# Patient Record
Sex: MALE | Race: WHITE | NOT HISPANIC OR LATINO | Employment: FULL TIME
[De-identification: names, ages, dates, MRNs, and addresses within clinical notes are randomized per-mention and may not be internally consistent; named-entity substitution may affect disease eponyms.]

---

## 2017-07-20 ENCOUNTER — RECORDS - HEALTHEAST (OUTPATIENT)
Dept: ADMINISTRATIVE | Facility: OTHER | Age: 22
End: 2017-07-20

## 2017-07-26 ENCOUNTER — RECORDS - HEALTHEAST (OUTPATIENT)
Dept: ADMINISTRATIVE | Facility: OTHER | Age: 22
End: 2017-07-26

## 2018-05-03 ENCOUNTER — AMBULATORY - HEALTHEAST (OUTPATIENT)
Dept: INTERNAL MEDICINE | Facility: CLINIC | Age: 23
End: 2018-05-03

## 2018-05-10 ENCOUNTER — RECORDS - HEALTHEAST (OUTPATIENT)
Dept: ADMINISTRATIVE | Facility: OTHER | Age: 23
End: 2018-05-10

## 2018-05-10 ENCOUNTER — AMBULATORY - HEALTHEAST (OUTPATIENT)
Dept: INTERNAL MEDICINE | Facility: CLINIC | Age: 23
End: 2018-05-10

## 2018-05-11 ENCOUNTER — COMMUNICATION - HEALTHEAST (OUTPATIENT)
Dept: TELEHEALTH | Facility: CLINIC | Age: 23
End: 2018-05-11

## 2018-05-11 ENCOUNTER — OFFICE VISIT - HEALTHEAST (OUTPATIENT)
Dept: INTERNAL MEDICINE | Facility: CLINIC | Age: 23
End: 2018-05-11

## 2018-05-11 DIAGNOSIS — Z71.84 TRAVEL ADVICE ENCOUNTER: ICD-10-CM

## 2018-05-11 DIAGNOSIS — E61.1 IRON DEFICIENCY: ICD-10-CM

## 2018-05-11 DIAGNOSIS — Z00.00 ROUTINE GENERAL MEDICAL EXAMINATION AT A HEALTH CARE FACILITY: ICD-10-CM

## 2018-05-11 DIAGNOSIS — K50.80 CROHN'S DISEASE OF BOTH SMALL AND LARGE INTESTINE WITHOUT COMPLICATION (H): ICD-10-CM

## 2018-05-11 LAB — MEV IGG SER IA-ACNC: POSITIVE

## 2018-05-11 ASSESSMENT — MIFFLIN-ST. JEOR: SCORE: 1770.1

## 2018-05-14 ENCOUNTER — RECORDS - HEALTHEAST (OUTPATIENT)
Dept: ADMINISTRATIVE | Facility: OTHER | Age: 23
End: 2018-05-14

## 2018-05-24 ENCOUNTER — AMBULATORY - HEALTHEAST (OUTPATIENT)
Dept: LAB | Facility: CLINIC | Age: 23
End: 2018-05-24

## 2018-07-03 ENCOUNTER — AMBULATORY - HEALTHEAST (OUTPATIENT)
Dept: LAB | Facility: CLINIC | Age: 23
End: 2018-07-03

## 2018-07-03 DIAGNOSIS — K50.80 CROHN'S DISEASE OF BOTH SMALL AND LARGE INTESTINE WITHOUT COMPLICATION (H): ICD-10-CM

## 2018-07-03 LAB
ALT SERPL W P-5'-P-CCNC: 15 U/L (ref 0–45)
AST SERPL W P-5'-P-CCNC: 24 U/L (ref 0–40)
BASOPHILS # BLD AUTO: 0 THOU/UL (ref 0–0.2)
BASOPHILS NFR BLD AUTO: 1 % (ref 0–2)
EOSINOPHIL # BLD AUTO: 0.4 THOU/UL (ref 0–0.4)
EOSINOPHIL NFR BLD AUTO: 8 % (ref 0–6)
ERYTHROCYTE [DISTWIDTH] IN BLOOD BY AUTOMATED COUNT: 13.6 % (ref 11–14.5)
HCT VFR BLD AUTO: 44.3 % (ref 40–54)
HGB BLD-MCNC: 14.7 G/DL (ref 14–18)
LYMPHOCYTES # BLD AUTO: 1.5 THOU/UL (ref 0.8–4.4)
LYMPHOCYTES NFR BLD AUTO: 29 % (ref 20–40)
MCH RBC QN AUTO: 30.9 PG (ref 27–34)
MCHC RBC AUTO-ENTMCNC: 33.2 G/DL (ref 32–36)
MCV RBC AUTO: 93 FL (ref 80–100)
MONOCYTES # BLD AUTO: 0.7 THOU/UL (ref 0–0.9)
MONOCYTES NFR BLD AUTO: 14 % (ref 2–10)
NEUTROPHILS # BLD AUTO: 2.5 THOU/UL (ref 2–7.7)
NEUTROPHILS NFR BLD AUTO: 48 % (ref 50–70)
PLATELET # BLD AUTO: 227 THOU/UL (ref 140–440)
PMV BLD AUTO: 7.4 FL (ref 7–10)
RBC # BLD AUTO: 4.77 MILL/UL (ref 4.4–6.2)
WBC: 5.1 THOU/UL (ref 4–11)

## 2018-07-05 ENCOUNTER — COMMUNICATION - HEALTHEAST (OUTPATIENT)
Dept: INTERNAL MEDICINE | Facility: CLINIC | Age: 23
End: 2018-07-05

## 2018-11-21 ENCOUNTER — AMBULATORY - HEALTHEAST (OUTPATIENT)
Dept: LAB | Facility: CLINIC | Age: 23
End: 2018-11-21

## 2018-11-21 ENCOUNTER — COMMUNICATION - HEALTHEAST (OUTPATIENT)
Dept: INTERNAL MEDICINE | Facility: CLINIC | Age: 23
End: 2018-11-21

## 2018-11-21 DIAGNOSIS — K50.80 CROHN'S DISEASE OF BOTH SMALL AND LARGE INTESTINE WITHOUT COMPLICATION (H): ICD-10-CM

## 2018-11-21 LAB
ALT SERPL W P-5'-P-CCNC: 12 U/L (ref 0–45)
AST SERPL W P-5'-P-CCNC: 21 U/L (ref 0–40)
BASOPHILS # BLD AUTO: 0.1 THOU/UL (ref 0–0.2)
BASOPHILS NFR BLD AUTO: 2 % (ref 0–2)
EOSINOPHIL # BLD AUTO: 0.3 THOU/UL (ref 0–0.4)
EOSINOPHIL NFR BLD AUTO: 5 % (ref 0–6)
ERYTHROCYTE [DISTWIDTH] IN BLOOD BY AUTOMATED COUNT: 15.7 % (ref 11–14.5)
HCT VFR BLD AUTO: 44.5 % (ref 40–54)
HGB BLD-MCNC: 14.9 G/DL (ref 14–18)
LYMPHOCYTES # BLD AUTO: 1.1 THOU/UL (ref 0.8–4.4)
LYMPHOCYTES NFR BLD AUTO: 22 % (ref 20–40)
MCH RBC QN AUTO: 31 PG (ref 27–34)
MCHC RBC AUTO-ENTMCNC: 33.5 G/DL (ref 32–36)
MCV RBC AUTO: 92 FL (ref 80–100)
MONOCYTES # BLD AUTO: 0.5 THOU/UL (ref 0–0.9)
MONOCYTES NFR BLD AUTO: 10 % (ref 2–10)
NEUTROPHILS # BLD AUTO: 3.2 THOU/UL (ref 2–7.7)
NEUTROPHILS NFR BLD AUTO: 62 % (ref 50–70)
PLATELET # BLD AUTO: 270 THOU/UL (ref 140–440)
PMV BLD AUTO: 7.2 FL (ref 7–10)
RBC # BLD AUTO: 4.81 MILL/UL (ref 4.4–6.2)
WBC: 5.1 THOU/UL (ref 4–11)

## 2018-11-28 ENCOUNTER — COMMUNICATION - HEALTHEAST (OUTPATIENT)
Dept: INTERNAL MEDICINE | Facility: CLINIC | Age: 23
End: 2018-11-28

## 2018-12-19 ENCOUNTER — RECORDS - HEALTHEAST (OUTPATIENT)
Dept: ADMINISTRATIVE | Facility: OTHER | Age: 23
End: 2018-12-19

## 2019-01-04 ENCOUNTER — AMBULATORY - HEALTHEAST (OUTPATIENT)
Dept: LAB | Facility: CLINIC | Age: 24
End: 2019-01-04

## 2019-01-04 DIAGNOSIS — K50.80 CROHN'S DISEASE OF BOTH SMALL AND LARGE INTESTINE WITHOUT COMPLICATION (H): ICD-10-CM

## 2019-01-04 LAB
ALT SERPL W P-5'-P-CCNC: 38 U/L (ref 0–45)
AST SERPL W P-5'-P-CCNC: 26 U/L (ref 0–40)
BASOPHILS # BLD AUTO: 0.1 THOU/UL (ref 0–0.2)
BASOPHILS NFR BLD AUTO: 1 % (ref 0–2)
EOSINOPHIL # BLD AUTO: 0.2 THOU/UL (ref 0–0.4)
EOSINOPHIL NFR BLD AUTO: 4 % (ref 0–6)
ERYTHROCYTE [DISTWIDTH] IN BLOOD BY AUTOMATED COUNT: 13 % (ref 11–14.5)
HCT VFR BLD AUTO: 45.4 % (ref 40–54)
HGB BLD-MCNC: 15.1 G/DL (ref 14–18)
LYMPHOCYTES # BLD AUTO: 1.4 THOU/UL (ref 0.8–4.4)
LYMPHOCYTES NFR BLD AUTO: 27 % (ref 20–40)
MCH RBC QN AUTO: 31.5 PG (ref 27–34)
MCHC RBC AUTO-ENTMCNC: 33.2 G/DL (ref 32–36)
MCV RBC AUTO: 95 FL (ref 80–100)
MONOCYTES # BLD AUTO: 0.4 THOU/UL (ref 0–0.9)
MONOCYTES NFR BLD AUTO: 8 % (ref 2–10)
NEUTROPHILS # BLD AUTO: 3.1 THOU/UL (ref 2–7.7)
NEUTROPHILS NFR BLD AUTO: 60 % (ref 50–70)
PLATELET # BLD AUTO: 264 THOU/UL (ref 140–440)
PMV BLD AUTO: 8.1 FL (ref 7–10)
RBC # BLD AUTO: 4.78 MILL/UL (ref 4.4–6.2)
WBC: 5.1 THOU/UL (ref 4–11)

## 2019-08-15 ENCOUNTER — AMBULATORY - HEALTHEAST (OUTPATIENT)
Dept: LAB | Facility: CLINIC | Age: 24
End: 2019-08-15

## 2019-08-15 ENCOUNTER — COMMUNICATION - HEALTHEAST (OUTPATIENT)
Dept: INTERNAL MEDICINE | Facility: CLINIC | Age: 24
End: 2019-08-15

## 2019-08-15 DIAGNOSIS — K50.90 CROHN DISEASE (H): ICD-10-CM

## 2019-08-15 LAB
ALT SERPL W P-5'-P-CCNC: 18 U/L (ref 0–45)
AST SERPL W P-5'-P-CCNC: 20 U/L (ref 0–40)
BASOPHILS # BLD AUTO: 0.1 THOU/UL (ref 0–0.2)
BASOPHILS NFR BLD AUTO: 1 % (ref 0–2)
EOSINOPHIL # BLD AUTO: 0.3 THOU/UL (ref 0–0.4)
EOSINOPHIL NFR BLD AUTO: 4 % (ref 0–6)
ERYTHROCYTE [DISTWIDTH] IN BLOOD BY AUTOMATED COUNT: 13.3 % (ref 11–14.5)
HCT VFR BLD AUTO: 48 % (ref 40–54)
HGB BLD-MCNC: 16.2 G/DL (ref 14–18)
LYMPHOCYTES # BLD AUTO: 1.6 THOU/UL (ref 0.8–4.4)
LYMPHOCYTES NFR BLD AUTO: 23 % (ref 20–40)
MCH RBC QN AUTO: 32.4 PG (ref 27–34)
MCHC RBC AUTO-ENTMCNC: 33.8 G/DL (ref 32–36)
MCV RBC AUTO: 96 FL (ref 80–100)
MONOCYTES # BLD AUTO: 0.7 THOU/UL (ref 0–0.9)
MONOCYTES NFR BLD AUTO: 10 % (ref 2–10)
NEUTROPHILS # BLD AUTO: 4.3 THOU/UL (ref 2–7.7)
NEUTROPHILS NFR BLD AUTO: 62 % (ref 50–70)
PLATELET # BLD AUTO: 268 THOU/UL (ref 140–440)
PMV BLD AUTO: 7.4 FL (ref 7–10)
RBC # BLD AUTO: 5.01 MILL/UL (ref 4.4–6.2)
WBC: 6.9 THOU/UL (ref 4–11)

## 2019-08-16 ENCOUNTER — COMMUNICATION - HEALTHEAST (OUTPATIENT)
Dept: INTERNAL MEDICINE | Facility: CLINIC | Age: 24
End: 2019-08-16

## 2019-12-17 ENCOUNTER — RECORDS - HEALTHEAST (OUTPATIENT)
Dept: ADMINISTRATIVE | Facility: OTHER | Age: 24
End: 2019-12-17

## 2019-12-30 ENCOUNTER — AMBULATORY - HEALTHEAST (OUTPATIENT)
Dept: LAB | Facility: CLINIC | Age: 24
End: 2019-12-30

## 2019-12-30 DIAGNOSIS — K50.90 CROHN DISEASE (H): ICD-10-CM

## 2019-12-30 LAB
ALT SERPL W P-5'-P-CCNC: 25 U/L (ref 0–45)
AST SERPL W P-5'-P-CCNC: 24 U/L (ref 0–40)
BASOPHILS # BLD AUTO: 0 THOU/UL (ref 0–0.2)
BASOPHILS NFR BLD AUTO: 1 % (ref 0–2)
EOSINOPHIL # BLD AUTO: 0.2 THOU/UL (ref 0–0.4)
EOSINOPHIL NFR BLD AUTO: 3 % (ref 0–6)
ERYTHROCYTE [DISTWIDTH] IN BLOOD BY AUTOMATED COUNT: 12.6 % (ref 11–14.5)
HCT VFR BLD AUTO: 48.6 % (ref 40–54)
HGB BLD-MCNC: 16.2 G/DL (ref 14–18)
LYMPHOCYTES # BLD AUTO: 1.1 THOU/UL (ref 0.8–4.4)
LYMPHOCYTES NFR BLD AUTO: 17 % (ref 20–40)
MCH RBC QN AUTO: 33.6 PG (ref 27–34)
MCHC RBC AUTO-ENTMCNC: 33.4 G/DL (ref 32–36)
MCV RBC AUTO: 101 FL (ref 80–100)
MONOCYTES # BLD AUTO: 0.6 THOU/UL (ref 0–0.9)
MONOCYTES NFR BLD AUTO: 10 % (ref 2–10)
NEUTROPHILS # BLD AUTO: 4.2 THOU/UL (ref 2–7.7)
NEUTROPHILS NFR BLD AUTO: 70 % (ref 50–70)
PLATELET # BLD AUTO: 272 THOU/UL (ref 140–440)
PMV BLD AUTO: 7.3 FL (ref 7–10)
RBC # BLD AUTO: 4.84 MILL/UL (ref 4.4–6.2)
WBC: 6.1 THOU/UL (ref 4–11)

## 2020-01-05 ENCOUNTER — COMMUNICATION - HEALTHEAST (OUTPATIENT)
Dept: INTERNAL MEDICINE | Facility: CLINIC | Age: 25
End: 2020-01-05

## 2020-12-28 ENCOUNTER — AMBULATORY - HEALTHEAST (OUTPATIENT)
Dept: LAB | Facility: CLINIC | Age: 25
End: 2020-12-28

## 2020-12-28 DIAGNOSIS — K50.80 CROHN'S ILEOCOLITIS (H): ICD-10-CM

## 2020-12-28 LAB
BASOPHILS # BLD AUTO: 0 THOU/UL (ref 0–0.2)
BASOPHILS NFR BLD AUTO: 1 % (ref 0–2)
EOSINOPHIL # BLD AUTO: 0.3 THOU/UL (ref 0–0.4)
EOSINOPHIL NFR BLD AUTO: 5 % (ref 0–6)
ERYTHROCYTE [DISTWIDTH] IN BLOOD BY AUTOMATED COUNT: 12 % (ref 11–14.5)
HCT VFR BLD AUTO: 44.1 % (ref 40–54)
HGB BLD-MCNC: 15.3 G/DL (ref 14–18)
LYMPHOCYTES # BLD AUTO: 1.9 THOU/UL (ref 0.8–4.4)
LYMPHOCYTES NFR BLD AUTO: 37 % (ref 20–40)
MCH RBC QN AUTO: 29.1 PG (ref 27–34)
MCHC RBC AUTO-ENTMCNC: 34.6 G/DL (ref 32–36)
MCV RBC AUTO: 84 FL (ref 80–100)
MONOCYTES # BLD AUTO: 0.4 THOU/UL (ref 0–0.9)
MONOCYTES NFR BLD AUTO: 8 % (ref 2–10)
NEUTROPHILS # BLD AUTO: 2.4 THOU/UL (ref 2–7.7)
NEUTROPHILS NFR BLD AUTO: 49 % (ref 50–70)
PLATELET # BLD AUTO: 203 THOU/UL (ref 140–440)
PMV BLD AUTO: 7.3 FL (ref 7–10)
RBC # BLD AUTO: 5.24 MILL/UL (ref 4.4–6.2)
WBC: 5 THOU/UL (ref 4–11)

## 2020-12-29 LAB
ALT SERPL W P-5'-P-CCNC: 77 U/L (ref 0–45)
AST SERPL W P-5'-P-CCNC: 83 U/L (ref 0–40)

## 2020-12-30 ENCOUNTER — COMMUNICATION - HEALTHEAST (OUTPATIENT)
Dept: INTERNAL MEDICINE | Facility: CLINIC | Age: 25
End: 2020-12-30

## 2021-01-11 ENCOUNTER — AMBULATORY - HEALTHEAST (OUTPATIENT)
Dept: LAB | Facility: CLINIC | Age: 26
End: 2021-01-11

## 2021-01-11 DIAGNOSIS — K50.80 CROHN'S ILEOCOLITIS (H): ICD-10-CM

## 2021-01-14 ENCOUNTER — AMBULATORY - HEALTHEAST (OUTPATIENT)
Dept: LAB | Facility: CLINIC | Age: 26
End: 2021-01-14

## 2021-01-14 DIAGNOSIS — K50.80 CROHN'S ILEOCOLITIS (H): ICD-10-CM

## 2021-01-14 LAB
BASOPHILS # BLD AUTO: 0 THOU/UL (ref 0–0.2)
BASOPHILS NFR BLD AUTO: 1 % (ref 0–2)
EOSINOPHIL # BLD AUTO: 0.2 THOU/UL (ref 0–0.4)
EOSINOPHIL NFR BLD AUTO: 4 % (ref 0–6)
ERYTHROCYTE [DISTWIDTH] IN BLOOD BY AUTOMATED COUNT: 12.4 % (ref 11–14.5)
HCT VFR BLD AUTO: 46 % (ref 40–54)
HGB BLD-MCNC: 16 G/DL (ref 14–18)
LYMPHOCYTES # BLD AUTO: 2 THOU/UL (ref 0.8–4.4)
LYMPHOCYTES NFR BLD AUTO: 38 % (ref 20–40)
MCH RBC QN AUTO: 29.4 PG (ref 27–34)
MCHC RBC AUTO-ENTMCNC: 34.9 G/DL (ref 32–36)
MCV RBC AUTO: 84 FL (ref 80–100)
MONOCYTES # BLD AUTO: 0.5 THOU/UL (ref 0–0.9)
MONOCYTES NFR BLD AUTO: 10 % (ref 2–10)
NEUTROPHILS # BLD AUTO: 2.6 THOU/UL (ref 2–7.7)
NEUTROPHILS NFR BLD AUTO: 48 % (ref 50–70)
PLATELET # BLD AUTO: 185 THOU/UL (ref 140–440)
PMV BLD AUTO: 7.2 FL (ref 7–10)
RBC # BLD AUTO: 5.46 MILL/UL (ref 4.4–6.2)
WBC: 5.4 THOU/UL (ref 4–11)

## 2021-01-15 LAB
ALT SERPL W P-5'-P-CCNC: 27 U/L (ref 0–45)
AST SERPL W P-5'-P-CCNC: 20 U/L (ref 0–40)

## 2021-01-18 ENCOUNTER — COMMUNICATION - HEALTHEAST (OUTPATIENT)
Dept: INTERNAL MEDICINE | Facility: CLINIC | Age: 26
End: 2021-01-18

## 2021-02-02 ENCOUNTER — RECORDS - HEALTHEAST (OUTPATIENT)
Dept: ADMINISTRATIVE | Facility: OTHER | Age: 26
End: 2021-02-02

## 2021-03-03 ENCOUNTER — OFFICE VISIT - HEALTHEAST (OUTPATIENT)
Dept: INTERNAL MEDICINE | Facility: CLINIC | Age: 26
End: 2021-03-03

## 2021-03-03 DIAGNOSIS — K50.80 CROHN'S DISEASE OF BOTH SMALL AND LARGE INTESTINE WITHOUT COMPLICATION (H): ICD-10-CM

## 2021-03-03 DIAGNOSIS — G44.229 CHRONIC TENSION-TYPE HEADACHE, NOT INTRACTABLE: ICD-10-CM

## 2021-03-03 ASSESSMENT — MIFFLIN-ST. JEOR: SCORE: 1829.07

## 2021-03-10 ENCOUNTER — HOSPITAL ENCOUNTER (OUTPATIENT)
Dept: MRI IMAGING | Facility: HOSPITAL | Age: 26
Discharge: HOME | End: 2021-03-10
Attending: INTERNAL MEDICINE

## 2021-03-10 DIAGNOSIS — G44.229 CHRONIC TENSION-TYPE HEADACHE, NOT INTRACTABLE: ICD-10-CM

## 2021-05-30 ENCOUNTER — HEALTH MAINTENANCE LETTER (OUTPATIENT)
Age: 26
End: 2021-05-30

## 2021-05-31 NOTE — TELEPHONE ENCOUNTER
Who is calling:  Servando  Reason for Call:  Did clinic get the lab order from [facility] that was faxed yesterday?  Date of last appointment with primary care: 5/11/18  Okay to leave a detailed message: Yes

## 2021-06-01 VITALS — BODY MASS INDEX: 21.07 KG/M2 | WEIGHT: 159 LBS | HEIGHT: 73 IN

## 2021-06-05 VITALS
HEIGHT: 73 IN | OXYGEN SATURATION: 98 % | WEIGHT: 172 LBS | DIASTOLIC BLOOD PRESSURE: 78 MMHG | BODY MASS INDEX: 22.8 KG/M2 | HEART RATE: 72 BPM | SYSTOLIC BLOOD PRESSURE: 112 MMHG

## 2021-06-15 NOTE — PROGRESS NOTES
Office Visit - Follow Up   Servando Mcdonough   25 y.o. male    Date of Visit: 3/3/2021    Chief Complaint   Patient presents with     Migraine        Assessment and Plan   1. Chronic tension-type headache, not intractable  This is a 25-year-old man with new onset of headache over the left temple.  Initially severe and now persistent and making.  He is on immunosuppressive medication.  Family history of glioblastoma.  He has no exam findings to suggest meningitis.  Fairly consistent with migraine type headache.  I have recommended MRI as below.  I also suggested regular use of acetaminophen for about a week to see if he can break the headache cycle.  - MR Brain Without Contrast; Future    2. Crohn's disease of both small and large intestine without complication (H)  As above improved with Humira    Return in about 4 weeks (around 3/31/2021) for office visit, if symptoms worsen/fail to improve.     History of Present Illness   This 25 y.o. old man comes in for evaluation of a headache.  Started about 3 weeks ago with a very severe headache on the left temple area.  He went to bed and this improved and since then he has had mild pain on the same area that has not gone away.  He has tried some Tylenol which is helped modestly.  He has had no associated photophobia or nausea or vomiting.  No visual scotomata.  A little bit of tingling in his legs yesterday which is resolved.  No weight changes no fever chills.  Slight neck tenderness which has resolved as well.  No significant confusion although felt a little bit foggy.  He is on immunosuppressive medication for Crohn's disease which she recently started.  He does not drink all the much alcohol.  He recently started coffee regularly at the beginning of the year.  He has 1 or 2 cups in the morning.  History of migraine headaches.    Review of Systems: A comprehensive review of systems was negative except as noted.     Medications, Allergies and Problem List   Reviewed,  "reconciled and updated  Post Discharge Medication Reconciliation Status:      Physical Exam   General Appearance:   No acute distress    /78 (Patient Site: Left Arm, Patient Position: Sitting, Cuff Size: Adult Regular)   Pulse 72   Ht 6' 1\" (1.854 m)   Wt 172 lb (78 kg)   SpO2 98%   BMI 22.69 kg/m      HEENT exam is unremarkable  Neck supple no thyromegaly or nodule palpable  Lymphatic no cervical lymphadenopathy  Cardiovascular regular rate and rhythm no murmur gallop or rub  Pulmonary lungs are clear to auscultation bilaterally  Gastrointestinal abdomen soft nontender nondistended no organomegaly  Neurologic exam is entirely normal  Psychiatric pleasant, no confusion or agitation        Additional Information   Current Outpatient Medications   Medication Sig Dispense Refill     adalimumab 80 mg/0.8 mL PnKt Inject 80 mg under the skin.       No current facility-administered medications for this visit.      No Known Allergies  Social History     Tobacco Use     Smoking status: Never Smoker     Smokeless tobacco: Never Used   Substance Use Topics     Alcohol use: Yes     Drug use: No       Review and/or order of clinical lab tests:  Review and/or order of radiology tests:  Review and/or order of medicine tests:  Discussion of test results with performing physician:  Decision to obtain old records and/or obtain history from someone other than the patient:  Review and summarization of old records and/or obtaining history from someone other than the patient and.or discussion of case with another health care provider:  Independent visualization of image, tracing or specimen itself:    Time:      Mesfin Costello MD  "

## 2021-06-17 NOTE — PROGRESS NOTES
Office Visit - Physical   Servando Mcdonough   22 y.o.  male    Date of visit: 5/11/2018  Physician: Mesfin Costello MD     Assessment and Plan   1. Routine general medical examination at a health care facility  This is a healthy 22-year-old man.  Declines screening for sexually transmitted infections.  Will get labs every 3 months from [facility] for monitoring of Crohn's disease.  He will send us the lab order.    2. Travel advice encounter  Traveling to [place] and apparently there is a outbreak of measles  - Rubeola Antibody, IgG    3. Crohn's disease of both small and large intestine without complication  Well-controlled per [facility] labs every 3 months    4. Iron deficiency  We will follow his hemoglobin and ferritin presumably with [facility] labs.  Upper endoscopy recently unremarkable.  Negative testing for celiac disease.  No H pylori.  Some proctitis on colonoscopy.    Return in about 1 year (around 5/11/2019) for annual physical.     Chief Complaint   Chief Complaint   Patient presents with     [facility]        Patient Profile   Social History     Social History Narrative    Sophomore at [employer], studying finance.  Kiera Calderon.          Past Medical History   Patient Active Problem List   Diagnosis     Crohn's disease of both small and large intestine       Past Surgical History  He has a past surgical history that includes Appendectomy; Colonoscopy; and Tonsillectomy.     History of Present Illness   This 22 y.o. old man comes in to establish care and for annual physical.  His medical history was reviewed, electronic medical records updated and is reflected in this note.  Overall he is quite healthy.  He does have Crohn's disease and follows at [facility].  Well controlled with oral medication.  Has had some iron deficiency without anemia.    Review of Systems: A comprehensive review of systems was negative except as noted.     Medications and Allergies   Current Outpatient Prescriptions  "  Medication Sig Dispense Refill     mercaptopurine (PURINETHOL) 50 mg tablet Take 75 mg by mouth daily.       No current facility-administered medications for this visit.      No Known Allergies     Family and Social History   Family History   Problem Relation Age of Onset     No Medical Problems Mother      Prostate cancer Father      No Medical Problems Brother      Crohn's disease Brother      No Medical Problems Brother         Social History   Substance Use Topics     Smoking status: Never Smoker     Smokeless tobacco: Never Used     Alcohol use Yes        Physical Exam   General Appearance:   No acute distress    /68 (Patient Site: Left Arm, Patient Position: Sitting, Cuff Size: Adult Regular)  Pulse 84  Ht 6' 1\" (1.854 m)  Wt 159 lb (72.1 kg)  SpO2 98%  BMI 20.98 kg/m2    EYES: Eyelids, conjunctiva, and sclera were normal. Pupils were normal. Cornea, iris, and lens were normal bilaterally.  HEAD, EARS, NOSE, MOUTH, AND THROAT: Head and face were normal. Hearing was normal to voice and the ears were normal to external exam. Nose appearance was normal and there was no discharge. Oropharynx was normal.  NECK: Neck appearance was normal. There were no neck masses and the thyroid was not enlarged.  RESPIRATORY: Breathing pattern was normal and the chest moved symmetrically.  Percussion/auscultatory percussion was normal.  Lung sounds were normal and there were no abnormal sounds.  CARDIOVASCULAR: Heart rate and rhythm were normal.  S1 and S2 were normal and there were no extra sounds or murmurs. Peripheral pulses in arms and legs were normal.  Jugular venous pressure was normal.  There was no peripheral edema.  GASTROINTESTINAL: The abdomen was normal in contour.  Bowel sounds were present.  Percussion detected no organ enlargement or tenderness.  Palpation detected no tenderness, mass, or enlarged organs.   MUSCULOSKELETAL: Skeletal configuration was normal and muscle mass was normal for age. Joint " appearance was overall normal.  LYMPHATIC: There were no enlarged nodes.  SKIN/HAIR/NAILS: Skin color was normal.  There were no skin lesions.  Hair and nails were normal.  NEUROLOGIC: The patient was alert and oriented to person, place, time, and circumstance. Speech was normal. Cranial nerves were normal. Motor strength was normal for age. The patient was normally coordinated.  PSYCHIATRIC:  Mood and affect were normal and the patient had normal recent and remote memory. The patient's judgment and insight were normal.       Additional Information        Mesfin Costello MD  Internal Medicine  Contact me at [phone]

## 2021-06-19 NOTE — LETTER
Letter by Mesfin Costello MD at      Author: Mesfin Costello MD Service: -- Author Type: --    Filed:  Encounter Date: 8/16/2019 Status: (Other)         Servando Mcdonough  [address] [place] [address]             August 16, 2019         Dear Mr. Mcdonough,    Below are the results from your recent visit:    Resulted Orders   AST (SGOT)   Result Value Ref Range    AST 20 0 - 40 U/L   ALT (SGPT)   Result Value Ref Range    ALT 18 0 - 45 U/L   HM1 (CBC with Diff)   Result Value Ref Range    WBC 6.9 4.0 - 11.0 thou/uL    RBC 5.01 4.40 - 6.20 mill/uL    Hemoglobin 16.2 14.0 - 18.0 g/dL    Hematocrit 48.0 40.0 - 54.0 %    MCV 96 80 - 100 fL    MCH 32.4 27.0 - 34.0 pg    MCHC 33.8 32.0 - 36.0 g/dL    RDW 13.3 11.0 - 14.5 %    Platelets 268 140 - 440 thou/uL    MPV 7.4 7.0 - 10.0 fL    Neutrophils % 62 50 - 70 %    Lymphocytes % 23 20 - 40 %    Monocytes % 10 2 - 10 %    Eosinophils % 4 0 - 6 %    Basophils % 1 0 - 2 %    Neutrophils Absolute 4.3 2.0 - 7.7 thou/uL    Lymphocytes Absolute 1.6 0.8 - 4.4 thou/uL    Monocytes Absolute 0.7 0.0 - 0.9 thou/uL    Eosinophils Absolute 0.3 0.0 - 0.4 thou/uL    Basophils Absolute 0.1 0.0 - 0.2 thou/uL       Labs all look okay, excellent    Please call with questions or contact us using [employer].    Sincerely,        Electronically signed by Mesfin Costello MD

## 2021-06-20 NOTE — LETTER
Letter by Mesfin Costello MD at      Author: Mesfin Costello MD Service: -- Author Type: --    Filed:  Encounter Date: 1/5/2020 Status: Signed         Servando Mcdonough  [address] [place] [address]             January 5, 2020         Dear Mr. Mcdonough,    Below are the results from your recent visit:    Resulted Orders   AST (SGOT)   Result Value Ref Range    AST 24 0 - 40 U/L   ALT (SGPT)   Result Value Ref Range    ALT 25 0 - 45 U/L   HM1 (CBC with Diff)   Result Value Ref Range    WBC 6.1 4.0 - 11.0 thou/uL    RBC 4.84 4.40 - 6.20 mill/uL    Hemoglobin 16.2 14.0 - 18.0 g/dL    Hematocrit 48.6 40.0 - 54.0 %     (H) 80 - 100 fL    MCH 33.6 27.0 - 34.0 pg    MCHC 33.4 32.0 - 36.0 g/dL    RDW 12.6 11.0 - 14.5 %    Platelets 272 140 - 440 thou/uL    MPV 7.3 7.0 - 10.0 fL    Neutrophils % 70 50 - 70 %    Lymphocytes % 17 (L) 20 - 40 %    Monocytes % 10 2 - 10 %    Eosinophils % 3 0 - 6 %    Basophils % 1 0 - 2 %    Neutrophils Absolute 4.2 2.0 - 7.7 thou/uL    Lymphocytes Absolute 1.1 0.8 - 4.4 thou/uL    Monocytes Absolute 0.6 0.0 - 0.9 thou/uL    Eosinophils Absolute 0.2 0.0 - 0.4 thou/uL    Basophils Absolute 0.0 0.0 - 0.2 thou/uL       Labs ok/stable, excellent    Please call with questions or contact us using [employer].    Sincerely,        Electronically signed by Mesfin Costello MD

## 2021-06-21 NOTE — LETTER
Letter by Mesfin Costello MD at      Author: Mesfin Costello MD Service: -- Author Type: --    Filed:  Encounter Date: 1/18/2021 Status: (Other)         Servando Mcdonough  [address] [place] [address]             January 18, 2021         Dear Mr. Mcdonough,    Below are the results from your recent visit:    Resulted Orders   ALT (SGPT)   Result Value Ref Range    ALT 27 0 - 45 U/L   AST (SGOT)   Result Value Ref Range    AST 20 0 - 40 U/L   HM1 (CBC with Diff)   Result Value Ref Range    WBC 5.4 4.0 - 11.0 thou/uL    RBC 5.46 4.40 - 6.20 mill/uL    Hemoglobin 16.0 14.0 - 18.0 g/dL    Hematocrit 46.0 40.0 - 54.0 %    MCV 84 80 - 100 fL    MCH 29.4 27.0 - 34.0 pg    MCHC 34.9 32.0 - 36.0 g/dL    RDW 12.4 11.0 - 14.5 %    Platelets 185 140 - 440 thou/uL    MPV 7.2 7.0 - 10.0 fL    Neutrophils % 48 (L) 50 - 70 %    Lymphocytes % 38 20 - 40 %    Monocytes % 10 2 - 10 %    Eosinophils % 4 0 - 6 %    Basophils % 1 0 - 2 %    Neutrophils Absolute 2.6 2.0 - 7.7 thou/uL    Lymphocytes Absolute 2.0 0.8 - 4.4 thou/uL    Monocytes Absolute 0.5 0.0 - 0.9 thou/uL    Eosinophils Absolute 0.2 0.0 - 0.4 thou/uL    Basophils Absolute 0.0 0.0 - 0.2 thou/uL       Labs look okay/improved, excellent    Please call with questions or contact us using [employer].    Sincerely,        Electronically signed by Mesfin Costello MD

## 2021-06-21 NOTE — LETTER
Letter by Mesfin Costello MD at      Author: Mesfin Costello MD Service: -- Author Type: --    Filed:  Encounter Date: 12/30/2020 Status: (Other)         Servando Mcdonough  [address] [place] [address]             December 30, 2020         Dear Mr. Mcdonough,    Below are the results from your recent visit:    Resulted Orders   ALT (SGPT)   Result Value Ref Range    ALT 77 (H) 0 - 45 U/L   AST (SGOT)   Result Value Ref Range    AST 83 (H) 0 - 40 U/L   HM1 (CBC with Diff)   Result Value Ref Range    WBC 5.0 4.0 - 11.0 thou/uL    RBC 5.24 4.40 - 6.20 mill/uL    Hemoglobin 15.3 14.0 - 18.0 g/dL    Hematocrit 44.1 40.0 - 54.0 %    MCV 84 80 - 100 fL    MCH 29.1 27.0 - 34.0 pg    MCHC 34.6 32.0 - 36.0 g/dL    RDW 12.0 11.0 - 14.5 %    Platelets 203 140 - 440 thou/uL    MPV 7.3 7.0 - 10.0 fL    Neutrophils % 49 (L) 50 - 70 %    Lymphocytes % 37 20 - 40 %    Monocytes % 8 2 - 10 %    Eosinophils % 5 0 - 6 %    Basophils % 1 0 - 2 %    Neutrophils Absolute 2.4 2.0 - 7.7 thou/uL    Lymphocytes Absolute 1.9 0.8 - 4.4 thou/uL    Monocytes Absolute 0.4 0.0 - 0.9 thou/uL    Eosinophils Absolute 0.3 0.0 - 0.4 thou/uL    Basophils Absolute 0.0 0.0 - 0.2 thou/uL       Labs show mild elevation in your liver tests.  I believe these were ordered by [facility].  Please discuss with [place].  Happy to discuss with you as well, if needed.    Please call with questions or contact us using [employer].    Sincerely,        Electronically signed by Mesfin Costello MD

## 2021-06-29 ENCOUNTER — OFFICE VISIT - HEALTHEAST (OUTPATIENT)
Dept: INTERNAL MEDICINE | Facility: CLINIC | Age: 26
End: 2021-06-29

## 2021-06-29 DIAGNOSIS — R07.89 CHEST WALL PAIN: ICD-10-CM

## 2021-06-29 DIAGNOSIS — Z11.4 SCREENING FOR HIV (HUMAN IMMUNODEFICIENCY VIRUS): ICD-10-CM

## 2021-06-29 DIAGNOSIS — K50.80 CROHN'S DISEASE OF BOTH SMALL AND LARGE INTESTINE WITHOUT COMPLICATION (H): ICD-10-CM

## 2021-06-29 DIAGNOSIS — R07.89 OTHER CHEST PAIN: ICD-10-CM

## 2021-06-29 DIAGNOSIS — Z00.00 ROUTINE GENERAL MEDICAL EXAMINATION AT A HEALTH CARE FACILITY: ICD-10-CM

## 2021-06-29 DIAGNOSIS — Z11.3 SCREEN FOR STD (SEXUALLY TRANSMITTED DISEASE): ICD-10-CM

## 2021-06-29 DIAGNOSIS — R00.2 PALPITATIONS: ICD-10-CM

## 2021-06-29 DIAGNOSIS — G44.229 CHRONIC TENSION-TYPE HEADACHE, NOT INTRACTABLE: ICD-10-CM

## 2021-06-29 LAB
ALBUMIN SERPL-MCNC: 4.6 G/DL (ref 3.5–5)
ALP SERPL-CCNC: 36 U/L (ref 45–120)
ALT SERPL W P-5'-P-CCNC: 14 U/L (ref 0–45)
ANION GAP SERPL CALCULATED.3IONS-SCNC: 12 MMOL/L (ref 5–18)
AST SERPL W P-5'-P-CCNC: 17 U/L (ref 0–40)
BASOPHILS # BLD AUTO: 0 THOU/UL (ref 0–0.2)
BASOPHILS NFR BLD AUTO: 1 % (ref 0–2)
BILIRUB SERPL-MCNC: 0.4 MG/DL (ref 0–1)
BUN SERPL-MCNC: 20 MG/DL (ref 8–22)
CALCIUM SERPL-MCNC: 9.7 MG/DL (ref 8.5–10.5)
CHLORIDE BLD-SCNC: 106 MMOL/L (ref 98–107)
CO2 SERPL-SCNC: 23 MMOL/L (ref 22–31)
CREAT SERPL-MCNC: 0.84 MG/DL (ref 0.7–1.3)
EOSINOPHIL # BLD AUTO: 0.1 THOU/UL (ref 0–0.4)
EOSINOPHIL NFR BLD AUTO: 2 % (ref 0–6)
ERYTHROCYTE [DISTWIDTH] IN BLOOD BY AUTOMATED COUNT: 12 % (ref 11–14.5)
FERRITIN SERPL-MCNC: 35 NG/ML (ref 27–300)
GFR SERPL CREATININE-BSD FRML MDRD: >60 ML/MIN/1.73M2
GLUCOSE BLD-MCNC: 88 MG/DL (ref 70–125)
HCT VFR BLD AUTO: 45.1 % (ref 40–54)
HGB BLD-MCNC: 15.1 G/DL (ref 14–18)
HIV 1+2 AB+HIV1 P24 AG SERPL QL IA: NEGATIVE
IMM GRANULOCYTES # BLD: 0 THOU/UL
IMM GRANULOCYTES NFR BLD: 0 %
IRON SATN MFR SERPL: 20 % (ref 20–50)
IRON SERPL-MCNC: 76 UG/DL (ref 42–175)
LYMPHOCYTES # BLD AUTO: 1.7 THOU/UL (ref 0.8–4.4)
LYMPHOCYTES NFR BLD AUTO: 40 % (ref 20–40)
MCH RBC QN AUTO: 29 PG (ref 27–34)
MCHC RBC AUTO-ENTMCNC: 33.5 G/DL (ref 32–36)
MCV RBC AUTO: 87 FL (ref 80–100)
MONOCYTES # BLD AUTO: 0.5 THOU/UL (ref 0–0.9)
MONOCYTES NFR BLD AUTO: 11 % (ref 2–10)
NEUTROPHILS # BLD AUTO: 1.9 THOU/UL (ref 2–7.7)
NEUTROPHILS NFR BLD AUTO: 46 % (ref 50–70)
PLATELET # BLD AUTO: 188 THOU/UL (ref 140–440)
PMV BLD AUTO: 9.4 FL (ref 7–10)
POTASSIUM BLD-SCNC: 4.4 MMOL/L (ref 3.5–5)
PROT SERPL-MCNC: 7.1 G/DL (ref 6–8)
RBC # BLD AUTO: 5.21 MILL/UL (ref 4.4–6.2)
SODIUM SERPL-SCNC: 141 MMOL/L (ref 136–145)
TIBC SERPL-MCNC: 385 UG/DL (ref 313–563)
TRANSFERRIN SERPL-MCNC: 308 MG/DL (ref 212–360)
TSH SERPL DL<=0.005 MIU/L-ACNC: 1.99 UIU/ML (ref 0.3–5)
WBC: 4.2 THOU/UL (ref 4–11)

## 2021-06-29 RX ORDER — ADALIMUMAB 40MG/0.4ML
40 KIT SUBCUTANEOUS
Status: SHIPPED | COMMUNITY
Start: 2021-06-09

## 2021-06-29 ASSESSMENT — MIFFLIN-ST. JEOR: SCORE: 1844.04

## 2021-06-30 LAB
ATRIAL RATE - MUSE: 72 BPM
C TRACH DNA SPEC QL NAA+PROBE: NEGATIVE
DIASTOLIC BLOOD PRESSURE - MUSE: NORMAL
INTERPRETATION ECG - MUSE: NORMAL
N GONORRHOEA DNA SPEC QL NAA+PROBE: NEGATIVE
P AXIS - MUSE: 52 DEGREES
PR INTERVAL - MUSE: 156 MS
QRS DURATION - MUSE: 92 MS
QT - MUSE: 368 MS
QTC - MUSE: 402 MS
R AXIS - MUSE: 72 DEGREES
SPEC DESCRIPTION: NORMAL
SPECIMEN DESCRIPTION: NORMAL
SYSTOLIC BLOOD PRESSURE - MUSE: NORMAL
T AXIS - MUSE: 42 DEGREES
VENTRICULAR RATE- MUSE: 72 BPM

## 2021-07-03 NOTE — ADDENDUM NOTE
Addendum Note by Negro Guevara MLT at 5/15/2018  9:42 AM     Author: Negro Guevara MLT Service: -- Author Type:     Filed: 5/15/2018  9:42 AM Encounter Date: 5/11/2018 Status: Signed    : Negro Guevara MLT ()    Addended by: NEGRO GUEVARA on: 5/15/2018 09:42 AM        Modules accepted: Orders

## 2021-07-04 NOTE — PROGRESS NOTES
Office Visit - Physical   Servando Mcdonough   25 y.o.  male    Date of visit: 6/29/2021  Physician: Mesfin Costello MD     Assessment and Plan   1. Routine general medical examination at a health care facility  This is a 25-year-old man with issues as discussed below.  Ongoing healthy lifestyle discussed and recommended.  Immunizations are up-to-date    2. Chest wall pain  His symptoms of anterior chest wall pain with popping seems consistent with a costochondritis.  Will obtain an x-ray  - XR Chest 2 Views    3. Palpitations  Check EKG x-ray labs as below, consider event monitor, consider echocardiogram  - Thyroid Cascade  - Electrocardiogram Perform and Read  - XR Chest 2 Views    4. Chronic tension-type headache, not intractable  Seems to be improved with Excedrin acetaminophen and caffeine    5. Crohn's disease of both small and large intestine without complication (H)  On Humira per GI  - Iron and Transferrin Iron Binding Capacity  - Ferritin  - Comprehensive Metabolic Panel  - HM1 (CBC and Differential)    6. Screen for STD (sexually transmitted disease)  - Neisseria gonorrhoeae by PCR  - Chlamydia trachomatis by PCR    7. Screening for HIV (human immunodeficiency virus)  - HIV Antigen/Antibody Screening [place]    Return in about 3 months (around 9/29/2021) for recheck.     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  Kiera Calderon.          Past Medical History   Patient Active Problem List   Diagnosis     Crohn's disease of both small and large intestine (H)       Past Surgical History  He has a past surgical history that includes Appendectomy; Colonoscopy; and Tonsillectomy.     History of Present Illness   This 25 y.o. old comes in for annual physical and evaluation of a few symptoms.  Overall doing okay.  Has been having some headaches.  We obtained  "an MRI of the brain which looked okay.  He has been using occasional acetaminophen with caffeine which has been helpful and headaches seem improved.  He has been having some chest wall pain, popping type sensation.  Also having some palpitations that he experiences when he is lying in bed at night.  No associated anxiety.  No associated decrease in exercise tolerance shortness of breath.  Pain does not radiate.  Follows with GI regarding Crohn's disease, has been stable    Review of Systems: A comprehensive review of systems was negative except as noted.     Medications and Allergies   Current Outpatient Medications   Medication Sig Dispense Refill     HUMIRA,CF, PEN 40 mg/0.4 mL PnKt Inject 40 mg as directed every 14 (fourteen) days.       No current facility-administered medications for this visit.      No Known Allergies     Family and Social History   Family History   Problem Relation Age of Onset     No Medical Problems Mother      Prostate cancer Father      No Medical Problems Brother      Crohn's disease Brother      No Medical Problems Brother         Social History     Tobacco Use     Smoking status: Never Smoker     Smokeless tobacco: Never Used   Substance Use Topics     Alcohol use: Yes     Comment: 4-8     Drug use: No        Physical Exam   General Appearance:   No acute distress    /72 (Patient Site: Left Arm, Patient Position: Sitting, Cuff Size: Adult Regular)   Pulse 86   Ht 6' 1\" (1.854 m)   Wt 175 lb 4.8 oz (79.5 kg)   SpO2 98%   BMI 23.13 kg/m      EYES: Eyelids, conjunctiva, and sclera were normal. Pupils were normal. Cornea, iris, and lens were normal bilaterally.  HEAD, EARS, NOSE, MOUTH, AND THROAT: Head and face were normal. Hearing was normal to voice and the ears were normal to external exam. Nose appearance was normal and there was no discharge. Oropharynx was normal.  NECK: Neck appearance was normal. There were no neck masses and the thyroid was not enlarged.  RESPIRATORY: " Breathing pattern was normal and the chest moved symmetrically.  Percussion/auscultatory percussion was normal.  Lung sounds were normal and there were no abnormal sounds.  CARDIOVASCULAR: Heart rate and rhythm were normal.  S1 and S2 were normal and there were no extra sounds or murmurs. Peripheral pulses in arms and legs were normal.  Jugular venous pressure was normal.  There was no peripheral edema.  GASTROINTESTINAL: The abdomen was normal in contour.  Bowel sounds were present.  Percussion detected no organ enlargement or tenderness.  Palpation detected no tenderness, mass, or enlarged organs.   MUSCULOSKELETAL: Skeletal configuration was normal and muscle mass was normal for age. Joint appearance was overall normal.  LYMPHATIC: There were no enlarged nodes.  SKIN/HAIR/NAILS: Skin color was normal.  There were no skin lesions.  Hair and nails were normal.  NEUROLOGIC: The patient was alert and oriented to person, place, time, and circumstance. Speech was normal. Cranial nerves were normal. Motor strength was normal for age. The patient was normally coordinated.  PSYCHIATRIC:  Mood and affect were normal and the patient had normal recent and remote memory. The patient's judgment and insight were normal.       Additional Information        Mesfin Costello MD  Internal Medicine  Contact me at [phone]

## 2021-07-06 VITALS
HEIGHT: 73 IN | SYSTOLIC BLOOD PRESSURE: 120 MMHG | WEIGHT: 175.3 LBS | DIASTOLIC BLOOD PRESSURE: 72 MMHG | BODY MASS INDEX: 23.23 KG/M2 | HEART RATE: 86 BPM | OXYGEN SATURATION: 98 %

## 2021-08-05 ENCOUNTER — OFFICE VISIT (OUTPATIENT)
Dept: INTERNAL MEDICINE | Facility: CLINIC | Age: 26
End: 2021-08-05
Payer: COMMERCIAL

## 2021-08-05 VITALS
BODY MASS INDEX: 22.93 KG/M2 | HEIGHT: 73 IN | WEIGHT: 173 LBS | TEMPERATURE: 97.7 F | HEART RATE: 76 BPM | SYSTOLIC BLOOD PRESSURE: 118 MMHG | OXYGEN SATURATION: 96 % | DIASTOLIC BLOOD PRESSURE: 62 MMHG

## 2021-08-05 DIAGNOSIS — K50.919 CROHN'S DISEASE WITH COMPLICATION, UNSPECIFIED GASTROINTESTINAL TRACT LOCATION (H): ICD-10-CM

## 2021-08-05 DIAGNOSIS — R00.2 PALPITATIONS: Primary | ICD-10-CM

## 2021-08-05 PROBLEM — K50.90 CROHN'S DISEASE (H): Status: ACTIVE | Noted: 2021-08-05

## 2021-08-05 PROCEDURE — 99214 OFFICE O/P EST MOD 30 MIN: CPT | Performed by: INTERNAL MEDICINE

## 2021-08-05 ASSESSMENT — MIFFLIN-ST. JEOR: SCORE: 1833.6

## 2021-08-05 NOTE — PROGRESS NOTES
" ASSESSMENT AND PLAN:    1. Crohn's disease with complication  Doing well with humira presently.     2. Palpitations  Characteristic of noticing a normal pulse more prominently.  Exam is negative.  No caffeine or pseudoephedrine issues.  EKG has been normal.  Tolerates physical activity well.  Anxiety does not seem significant.  Likely heightened body awareness.  Will get echocardiogram.   - Echocardiogram Complete; Future    CHIEF COMPLAINT:  Palpitations.     HISTORY OF PRESENT ILLNESS:  Servando Mcdonough is a 25 year old male they continue.  Notes this as noticing a normal heart beat more than usual, at night.  No symptoms with exercise, no issues with excess caffeine or using pseudoephedrine.  No cough or dyspnea.     REVIEW OF SYSTEMS:   See HPI, all other systems on review are negative.    History   Smoking Status     Never Smoker   Smokeless Tobacco     Never Used     Family History   Problem Relation Age of Onset     No Known Problems Mother      Prostate Cancer Father      No Known Problems Brother      Crohn's Disease Brother      No Known Problems Brother      Past Surgical History:   Procedure Laterality Date     APPENDECTOMY       COLONOSCOPY       TONSILLECTOMY       VITALS:  Vitals:    08/05/21 0808   BP: 118/62   BP Location: Left arm   Patient Position: Sitting   Cuff Size: Adult Small   Pulse: 76   Temp: 97.7  F (36.5  C)   SpO2: 96%   Weight: 78.5 kg (173 lb)   Height: 1.854 m (6' 1\")     Wt Readings from Last 3 Encounters:   08/05/21 78.5 kg (173 lb)   06/29/21 79.5 kg (175 lb 4.8 oz)   03/03/21 78 kg (172 lb)     PHYSICAL EXAM:  Constitutional:  In NAD, alert and oriented  Neck: no cervical or axillary adenopathy  Cardiac:  S1 S2, no murmur heard  Lungs: Clear  Abdomen:   Soft, flat and non-tender    DECISION TO OBTAIN OLD RECORDS AND/OR OBTAIN HISTORY FROM SOMEONE OTHER THAN PATIENT, AND/OR ACCESSING CARE EVERYWHERE):  1  0     REVIEW AND SUMMARIZATION OF OLD RECORDS, AND/OR OBTAINING HISTORY FROM " SOMEONE OTHER THAN PATIENT, AND/OR DISCUSSION OF CASE WITH ANOTHER HEALTH CARE PROVIDER:  2 reviewed past health assessment    REVIEW AND/OR ORDER OF OF CLINICAL LAB TESTS: 1  ordered.    REVIEW AND/OR ORDER OF RADIOLOGY TESTS: 1 reviewed CXR.    REVIEW AND/OR ORDER OF MEDICAL TESTS (EKG/ECHO/COLONOSCOPY/EGD): 1  Reviewed EKG    INDEPENDENT  VISUALIZATION OF IMAGE, TRACING, OR SPECIMEN ITSELF (2 EACH):  0     TOTAL: 5    Current Outpatient Medications   Medication Sig Dispense Refill     HUMIRA,CF, PEN 40 mg/0.4 mL PnKt [HUMIRA,CF, PEN 40 MG/0.4 ML PNKT] Inject 40 mg as directed every 14 (fourteen) days.       Daniel Hightower MD  Internal Medicine  [facility]

## 2021-08-27 ENCOUNTER — HOSPITAL ENCOUNTER (OUTPATIENT)
Dept: CARDIOLOGY | Facility: HOSPITAL | Age: 26
Discharge: HOME | End: 2021-08-27
Attending: INTERNAL MEDICINE | Admitting: INTERNAL MEDICINE
Payer: COMMERCIAL

## 2021-08-27 DIAGNOSIS — R00.2 PALPITATIONS: ICD-10-CM

## 2021-08-27 PROCEDURE — 93306 TTE W/DOPPLER COMPLETE: CPT

## 2021-08-27 PROCEDURE — 93306 TTE W/DOPPLER COMPLETE: CPT | Mod: 26 | Performed by: INTERNAL MEDICINE

## 2021-09-19 ENCOUNTER — HEALTH MAINTENANCE LETTER (OUTPATIENT)
Age: 26
End: 2021-09-19

## 2021-09-23 ENCOUNTER — TRANSFERRED RECORDS (OUTPATIENT)
Dept: HEALTH INFORMATION MANAGEMENT | Facility: CLINIC | Age: 26
End: 2021-09-23

## 2022-01-11 ENCOUNTER — VIRTUAL VISIT (OUTPATIENT)
Dept: INTERNAL MEDICINE | Facility: CLINIC | Age: 27
End: 2022-01-11
Payer: COMMERCIAL

## 2022-01-11 DIAGNOSIS — J01.90 ACUTE SINUSITIS WITH SYMPTOMS > 10 DAYS: Primary | ICD-10-CM

## 2022-01-11 DIAGNOSIS — K50.919 CROHN'S DISEASE WITH COMPLICATION, UNSPECIFIED GASTROINTESTINAL TRACT LOCATION (H): ICD-10-CM

## 2022-01-11 PROCEDURE — 99214 OFFICE O/P EST MOD 30 MIN: CPT | Mod: 95 | Performed by: INTERNAL MEDICINE

## 2022-01-11 NOTE — PROGRESS NOTES
Servando is a 26 year old who is being evaluated via a billable video visit.      How would you like to obtain your AVS? MyChart  If the video visit is dropped, the invitation should be resent by: Text to cell phone: [phone]  Will anyone else be joining your video visit? No      Video Start Time: 8:11 AM    1. Acute sinusitis with symptoms > 10 days  Given the duration of his symptoms, I recommended Augmentin.  Does not seem to be consistent with COVID at this time given his negative testing and duration of symptoms.  Also recommend nasal saline for his nasal passages and sinuses.  We did discuss concern with antibiotics and underlying inflammatory bowel disease and possibility of developing an infection like C. difficile and he will monitor for the symptoms.  - amoxicillin-clavulanate (AUGMENTIN) 875-125 MG tablet; Take 1 tablet by mouth 2 times daily for 10 days  Dispense: 20 tablet; Refill: 0    2. Crohn's disease with complication, unspecified gastrointestinal tract location (H)  As above    Subjective   Servando is a 26 year old who presents for the following health issues this 26-year-old man is seen with nasal congestion, sore throat and neck discomfort.  This has been intermittent since Thanksgiving.  He is taking many COVID test which have been negative.  He is noting more congestion in the nose and sinus pressure.  Seems to be worsening rather than improving.  He has underlying inflammatory bowel disease and is on Humira.  He is up-to-date on his COVID immunizations.  HPI       Review of Systems       Objective           Vitals:  No vitals were obtained today due to virtual visit.    Physical Exam                   Video-Visit Details    Type of service:  Video Visit    Video End Time:8:23 AM    Originating Location (pt. Location): Home    Distant Location (provider location):  [facility]     Platform used for Video Visit: Manyeta

## 2022-02-15 ENCOUNTER — OFFICE VISIT (OUTPATIENT)
Dept: INTERNAL MEDICINE | Facility: CLINIC | Age: 27
End: 2022-02-15
Payer: COMMERCIAL

## 2022-02-15 VITALS
HEIGHT: 73 IN | BODY MASS INDEX: 23.58 KG/M2 | OXYGEN SATURATION: 98 % | WEIGHT: 177.9 LBS | HEART RATE: 74 BPM | DIASTOLIC BLOOD PRESSURE: 84 MMHG | SYSTOLIC BLOOD PRESSURE: 118 MMHG

## 2022-02-15 DIAGNOSIS — J32.9 CHRONIC SINUSITIS, UNSPECIFIED LOCATION: Primary | ICD-10-CM

## 2022-02-15 DIAGNOSIS — K50.919 CROHN'S DISEASE WITH COMPLICATION, UNSPECIFIED GASTROINTESTINAL TRACT LOCATION (H): ICD-10-CM

## 2022-02-15 DIAGNOSIS — R59.9 PALPABLE LYMPH NODE: ICD-10-CM

## 2022-02-15 PROCEDURE — 99214 OFFICE O/P EST MOD 30 MIN: CPT | Performed by: INTERNAL MEDICINE

## 2022-02-15 ASSESSMENT — MIFFLIN-ST. JEOR: SCORE: 1850.83

## 2022-02-15 NOTE — PROGRESS NOTES
Office Visit - Follow Up   Servando Mcdonough   26 year old male    Date of Visit: 2/15/2022    Chief Complaint   Patient presents with     RECHECK     still some congestion, cough is better        Assessment and Plan   1. Chronic sinusitis, unspecified location  I encouraged him to use nasal saline.  He could try an antihistamine such as loratadine and in case there is any reflux symptoms, recommend trial of famotidine as well.  He has not had any issues with Crohn's disease recently and we reviewed evaluation at [facility].  He is on Humira.  His MRI about a year ago did not show any chronic sinus issues and I will therefore defer any imaging to ENT.  - Otolaryngology Referral; Future    2. Palpable lymph node  He does have 1 small mobile submandibular lymph node and he is going to address this with ENT    3. Crohn's disease with complication, unspecified gastrointestinal tract location (H)  Appears well controlled, does not appear that his symptoms are related to his Crohn's disease such as esophagitis or any oral mucosal involvement    Return in about 4 weeks (around 3/15/2022) for Office visit, if symptoms worsen/fail to improve, video visit, if symptoms worsen/fail to improve.     History of Present Illness   This 26 year old man comes in for evaluation of nasal congestion and cough.  This is been going on for about 3 months.  He has tried over-the-counter remedies including Flonase nasal saline.  He is briefly tried some antihistamine.  I met with him virtually and we tried Augmentin.  This may have been minimally helpful but he continues to have nasal congestion especially at night.  He developed bloody noses with Flonase was stopped.  He has cough mainly at night.  He has minimal to no symptoms during the day.  No fever or chills.  1 episode of sharp chest pain while exercising but no other features of coronary heart disease.  No significant breathing difficulty.  He has had some issues with costochondritis  "in the past.  Recently had an echocardiogram and an EKG and chest x-ray which all looked okay.  He also had an MRI of the brain which looked okay and commented that his sinuses looked okay as well.    Review of Systems: A comprehensive review of systems was negative except as noted.     Medications, Allergies and Problem List   Reviewed, reconciled and updated  Post Discharge Medication Reconciliation Status:      Physical Exam   General Appearance:   No acute distress    /84 (BP Location: Left arm, Patient Position: Sitting, Cuff Size: Adult Regular)   Pulse 74   Ht 1.854 m (6' 1\")   Wt 80.7 kg (177 lb 14.4 oz)   SpO2 98%   BMI 23.47 kg/m      Traumatic membranes are unremarkable.  He has bloody drainage in the right nasal passage, left nasal passage appears clear.  He has no cervical lymphadenopathy.  He has 1 small mobile submandibular lymph node on the left side.  Cardiovascular regular rate and rhythm no murmur gallop or rub lungs are clear to auscultation bilaterally     Additional Information   Current Outpatient Medications   Medication Sig Dispense Refill     HUMIRA,CF, PEN 40 mg/0.4 mL PnKt [HUMIRA,CF, PEN 40 MG/0.4 ML PNKT] Inject 40 mg as directed every 14 (fourteen) days.       No Known Allergies  Social History     Tobacco Use     Smoking status: Never Smoker     Smokeless tobacco: Never Used   Substance Use Topics     Alcohol use: Yes     Comment: Alcoholic Drinks/day: 4-8     Drug use: No       Review and/or order of clinical lab tests:  Review and/or order of radiology tests:  Review and/or order of medicine tests:  Discussion of test results with performing physician:  Decision to obtain old records and/or obtain history from someone other than the patient:  Review and summarization of old records and/or obtaining history from someone other than the patient and.or discussion of case with another health care provider:  Independent visualization of image, tracing or specimen " itself:    Time:      Mesfin Costello MD

## 2022-02-17 ENCOUNTER — OFFICE VISIT (OUTPATIENT)
Dept: OTOLARYNGOLOGY | Facility: CLINIC | Age: 27
End: 2022-02-17
Attending: INTERNAL MEDICINE
Payer: COMMERCIAL

## 2022-02-17 DIAGNOSIS — J32.9 CHRONIC SINUSITIS, UNSPECIFIED LOCATION: ICD-10-CM

## 2022-02-17 DIAGNOSIS — K21.9 LPRD (LARYNGOPHARYNGEAL REFLUX DISEASE): ICD-10-CM

## 2022-02-17 DIAGNOSIS — R05.9 COUGH: ICD-10-CM

## 2022-02-17 DIAGNOSIS — R09.81 CONGESTION OF PARANASAL SINUS: Primary | ICD-10-CM

## 2022-02-17 DIAGNOSIS — K21.9 GASTROESOPHAGEAL REFLUX DISEASE WITHOUT ESOPHAGITIS: ICD-10-CM

## 2022-02-17 PROCEDURE — 31575 DIAGNOSTIC LARYNGOSCOPY: CPT | Performed by: OTOLARYNGOLOGY

## 2022-02-17 PROCEDURE — 99203 OFFICE O/P NEW LOW 30 MIN: CPT | Mod: 25 | Performed by: OTOLARYNGOLOGY

## 2022-02-17 RX ORDER — AZELASTINE 1 MG/ML
SPRAY, METERED NASAL
Qty: 30 ML | Refills: 11 | Status: SHIPPED | OUTPATIENT
Start: 2022-02-17 | End: 2022-06-01

## 2022-02-17 RX ORDER — SOD CHLOR,BICARB/SQUEEZ BOTTLE
PACKET, WITH RINSE DEVICE NASAL
Qty: 30 EACH | Refills: 3 | Status: SHIPPED | OUTPATIENT
Start: 2022-02-17 | End: 2022-06-01

## 2022-02-17 NOTE — PATIENT INSTRUCTIONS
Patient Education     GERD (Adult)    The esophagus is a tube that carries food from the mouth to the stomach. A valve (the LES, lower esophageal sphincter) at the lower end of the esophagus prevents stomach acid from flowing upward. When this valve doesn't work properly, stomach contents may repeatedly flow back up (reflux) into the esophagus. This is called gastroesophageal reflux disease (GERD). GERD can irritate the esophagus. It can cause problems with pain, swallowing or breathing. In severe cases, GERD can cause recurrent pneumonia (from aspiration or breathing in particles) or other serious problems.  Symptoms of reflux include burning, pressure or sharp pain in the upper abdomen or mid to lower chest. The pain can spread to the neck, back, or shoulder. There may be belching, an acid taste in the back of the throat, chronic cough, or sore throat, or hoarseness. GERD symptoms often occur during the day after a big meal. They can also occur at night when lying down.   Home care  Lifestyle changes can help reduce symptoms. If needed, your healthcare provider may prescribe medicines. Symptoms often improve with treatment, but if treatment is stopped, the symptoms often return after a few months. So most persons with GERD will need to continue treatment or get treatment on and off.  Lifestyle changes    Limit or avoid fatty, fried, and spicy foods, as well as coffee, chocolate, mint, and foods with high acid content such as tomatoes and citrus fruit and juices (orange, grapefruit, lemon).    Don t eat large meals, especially at night. Frequent, smaller meals are best. Don't lie down right after eating. And don t eat anything 3 hours before going to bed.    Don't drink alcohol or smoke. As much as possible, stay away from second hand smoke.    If you are overweight, losing weight will reduce symptoms.     Don't wear tight clothing around your stomach area.    If your symptoms occur during sleep, use a foam  "wedge to elevate your upper body (not just your head.) Or, place 4\" blocks under the head of your bed. Or use 2 bed risers under your bedframe.  Medicines  If needed, medicines can help relieve the symptoms of GERD and prevent damage to the esophagus. Discuss a medicine plan with your healthcare provider. This may include one or more of the following medicines:    Antacids to help neutralize the normal acids in your stomach.    Acid blockers (Histamine or H2 blockers) to decrease acid production.    Acid inhibitors (proton pump inhibitors PPIs) to decrease acid production in a different way than the blockers. They may work better, but can take a little longer to take effect.  Take an antacid 30 to 60 minutes after eating and at bedtime, but not at the same time as an acid blocker.  Try not to take medicines such as ibuprofen and aspirin. If you are taking aspirin for your heart or other medical reasons, talk to your healthcare provider about stopping it.  Follow-up care  Follow up with your healthcare provider or as advised by our staff.  When to seek medical advice  Call your healthcare provider if any of the following occur:    Stomach pain gets worse or moves to the lower right abdomen (appendix area)    Chest pain appears or gets worse, or spreads to the back, neck, shoulder, or arm    An over-the-counter trial of medicine doesn't relieve your symptoms    Weight loss that can't be explained    Trouble or pain swallowing    Frequent vomiting (can t keep down liquids)    Blood in the stool or vomit (red or black in color)    Feeling weak or dizzy    Fever of 100.4 F (38 C) or higher, or as directed by your healthcare provider  [employer] last reviewed this educational content on 3/1/2018    7251-9311 [employer]. All rights reserved. This information is not intended as a substitute for professional medical care. Always follow your healthcare professional's instructions.         Acid Suppression " Treatment    Stay on Pepcid at bedtime.       Lifestyle changes:    Avoid eating 2-3 hours before bedtime.   You may find it helpful to elevate the head of your bed.     Avoid following foods that are likely to trigger acid reflux:    Coffee or tea (try LOW ACID coffee or herbal tea)  Anything that s fizzy or has caffeine in it  Alcohol   Citrus fruits, such as oranges and cheryl  Tomato based foods (salsa, pizza, lasanga)  Chocolate   Mint or peppermint  Fatty foods (ice cream)  Spicy foods  Onions and garlic      Check with your GI Doctor about Barium Esophogram  Astetlin nasal spray as directed  Return visit 6 jaci

## 2022-02-17 NOTE — PROGRESS NOTES
CHIEF COMPLAINT:  Sinus Concern      HISTORY OF PRESENT ILLNESS    Servando was seen at the behest of Mesfin Costello MD for chronic sinus congestion.   Was given amoxicillin 1 month ago for a sinus infection.   Currently on Zyrtec and pepcid for congestion and cough.  Patient states that he has been on Pepcid at bedtime for the last several nights and this seems to be helping with his cough.  He does notice that his symptoms seem to be worse when he drinks alcohol.  He drinks coffee daily.  He only drinks sodas occasionally.  He has a GI doctor at the [facility] who follows him for Crohn's disease.    RSI = 15       Referral note:    This 26 year old man comes in for evaluation of nasal congestion and cough.  This is been going on for about 3 months.  He has tried over-the-counter remedies including Flonase nasal saline.    He is briefly tried some antihistamine.  I met with him virtually and we tried Augmentin.  This may have been minimally helpful but he continues to have nasal congestion especially at night.    He developed bloody noses with Flonase was stopped.    He has cough mainly at night.  He has minimal to no symptoms during the day.     fever or chills.  1 episode of sharp chest pain while exercising but no other features of coronary heart disease.    No significant breathing difficulty.      He has had some issues with costochondritis in the past.  Recently had an echocardiogram and an EKG and chest x-ray which all looked okay.    He also had an MRI of the brain which looked okay (no paranasal sinus mucosal disease).      IMPRESSION:  1.  Normal head MRI.    1. Chronic sinusitis, unspecified location  I encouraged him to use nasal saline.  He could try an antihistamine such as loratadine and in case there is any reflux symptoms, recommend trial of famotidine as well.  He has not had any issues with Crohn's disease recently and we reviewed evaluation at [facility].  He is on Humira.  His MRI about a year  ago did not show any chronic sinus issues and I will therefore defer any imaging to ENT.  - Otolaryngology Referral; Future     2. Palpable lymph node  He does have 1 small mobile submandibular lymph node and he is going to address this with ENT     REVIEW OF SYSTEMS    Review of Systems as per HPI and PMHx, otherwise 10 system review system are negative.     Patient has no known allergies.     There were no vitals taken for this visit.    HEAD: Normal appearance and symmetry:  No cutaneous lesions.      NECK:  supple     EARS: normal TM, EACs     NOSE:     Dorsum:   straight  Septum:  deviated left  Mucosa:  moist  Inferior turbinates:  wnl        ORAL CAVITY/OROPHARYNX:     Lips:  Normal.  Tongue: normal, midline  Mucosa:   no lesions  Tonsils:  1+     NECK:  Trachea:  midline.              Thyroid:  normal              Adenopathy:  none        NEURO:   Alert and Oriented     GAIT AND STATION:  normal     RESPIRATORY:   Symmetry and Respiratory effort     PSYCH:  Normal mood and affect     SKIN:   warm and dry         FLEX LARYNGOSCOPY:    After obtaining consent, a flexible laryngoscope is used to examine both nasal cavities, the nasopharynx, pharnx, and larynx.      Nasal cavity: thickened secretions  NP: inflamed  Pharnx: wnl  Larynx: TVCs mobile,    IMPRESSION:    Encounter Diagnoses   Name Primary?     Cough Yes     Congestion of paranasal sinus      Chronic sinusitis, unspecified location        RECOMMENDATIONS:    Stay on pepcid for time being  Nasal saline irrigations (Bashir Med kit)  Astelin spray at bedtime

## 2022-02-17 NOTE — PROGRESS NOTES
Pt states within the last 48 hours has not seen much of the coughing and sneezing at night  better since taking Zyrtec and Pepcid.

## 2022-02-17 NOTE — LETTER
2/17/2022         RE: Servando Mcdonough  [address]  [place] [address]        Dear Colleague,    Thank you for referring your patient, Servando Mcdonough, to the [facility]. Please see a copy of my visit note below.    CHIEF COMPLAINT:  Sinus Concern      HISTORY OF PRESENT ILLNESS    Servando was seen at the behest of Mesfin Costello MD for chronic sinus congestion.   Was given amoxicillin 1 month ago for a sinus infection.   Currently on Zyrtec and pepcid for congestion and cough.  Patient states that he has been on Pepcid at bedtime for the last several nights and this seems to be helping with his cough.  He does notice that his symptoms seem to be worse when he drinks alcohol.  He drinks coffee daily.  He only drinks sodas occasionally.  He has a GI doctor at the [facility] who follows him for Crohn's disease.    RSI = 15       Referral note:    This 26 year old man comes in for evaluation of nasal congestion and cough.  This is been going on for about 3 months.  He has tried over-the-counter remedies including Flonase nasal saline.    He is briefly tried some antihistamine.  I met with him virtually and we tried Augmentin.  This may have been minimally helpful but he continues to have nasal congestion especially at night.    He developed bloody noses with Flonase was stopped.    He has cough mainly at night.  He has minimal to no symptoms during the day.     fever or chills.  1 episode of sharp chest pain while exercising but no other features of coronary heart disease.    No significant breathing difficulty.      He has had some issues with costochondritis in the past.  Recently had an echocardiogram and an EKG and chest x-ray which all looked okay.    He also had an MRI of the brain which looked okay (no paranasal sinus mucosal disease).      IMPRESSION:  1.  Normal head MRI.    1. Chronic sinusitis, unspecified location  I encouraged him to use nasal saline.  He could try an  antihistamine such as loratadine and in case there is any reflux symptoms, recommend trial of famotidine as well.  He has not had any issues with Crohn's disease recently and we reviewed evaluation at [facility].  He is on Humira.  His MRI about a year ago did not show any chronic sinus issues and I will therefore defer any imaging to ENT.  - Otolaryngology Referral; Future     2. Palpable lymph node  He does have 1 small mobile submandibular lymph node and he is going to address this with ENT     REVIEW OF SYSTEMS    Review of Systems as per HPI and PMHx, otherwise 10 system review system are negative.     Patient has no known allergies.     There were no vitals taken for this visit.    HEAD: Normal appearance and symmetry:  No cutaneous lesions.      NECK:  supple     EARS: normal TM, EACs     NOSE:     Dorsum:   straight  Septum:  deviated left  Mucosa:  moist  Inferior turbinates:  wnl        ORAL CAVITY/OROPHARYNX:     Lips:  Normal.  Tongue: normal, midline  Mucosa:   no lesions  Tonsils:  1+     NECK:  Trachea:  midline.              Thyroid:  normal              Adenopathy:  none        NEURO:   Alert and Oriented     GAIT AND STATION:  normal     RESPIRATORY:   Symmetry and Respiratory effort     PSYCH:  Normal mood and affect     SKIN:   warm and dry         FLEX LARYNGOSCOPY:    After obtaining consent, a flexible laryngoscope is used to examine both nasal cavities, the nasopharynx, pharnx, and larynx.      Nasal cavity: thickened secretions  NP: inflamed  Pharnx: wnl  Larynx: TVCs mobile,    IMPRESSION:    Encounter Diagnoses   Name Primary?     Cough Yes     Congestion of paranasal sinus      Chronic sinusitis, unspecified location        RECOMMENDATIONS:    Stay on pepcid for time being  Nasal saline irrigations (Bashir Med kit)  Astelin spray at bedtime        Pt states within the last 48 hours has not seen much of the coughing and sneezing at night  better since taking Zyrtec and Pepcid.       Again,  thank you for allowing me to participate in the care of your patient.        Sincerely,        Bashir Yang MD

## 2022-03-24 ENCOUNTER — HOSPITAL ENCOUNTER (OUTPATIENT)
Dept: RADIOLOGY | Facility: CLINIC | Age: 27
Discharge: HOME | End: 2022-03-24
Attending: OTOLARYNGOLOGY | Admitting: OTOLARYNGOLOGY
Payer: COMMERCIAL

## 2022-03-24 DIAGNOSIS — K21.9 GASTROESOPHAGEAL REFLUX DISEASE WITHOUT ESOPHAGITIS: ICD-10-CM

## 2022-03-24 PROCEDURE — 74220 X-RAY XM ESOPHAGUS 1CNTRST: CPT

## 2022-03-25 ENCOUNTER — TELEPHONE (OUTPATIENT)
Dept: OTOLARYNGOLOGY | Facility: CLINIC | Age: 27
End: 2022-03-25
Payer: COMMERCIAL

## 2022-03-25 NOTE — TELEPHONE ENCOUNTER
Talked with Servando and per Dr. Yang I advised him that his Esophagram results are normal.  Patient expressed understanding.    [facility]      Sheila Helms RN  [facility]  ENT  [facility] [address]  [email or web address]   Office:[phone]  Employed by [facility]

## 2022-03-25 NOTE — TELEPHONE ENCOUNTER
----- Message from Bashir Yang MD sent at 3/24/2022  2:11 PM CDT -----  Please advise patient of normal results

## 2022-03-25 NOTE — TELEPHONE ENCOUNTER
Called patient and left a VM to call back.    [facility]      Sheila Helms RN  [facility]  ENT  [address] [address]  [email or web address]   Office:[phone]  Employed by [facility]

## 2022-03-30 NOTE — PROGRESS NOTES
CHIEF COMPLAINT:  Recheck      HISTORY OF PRESENT ILLNESS    Servando was seen in follow up for review of swallow study.  No real improvement with astelin nasal spray.  He has history of spring/fall.   Continues to have nasal congestion since November of last year.  No allergy testing.  No pets at home.  He is getting relief from Bashir Med rinses.  Some swelling of his right submandibular gland.     XR esophagram:    IMPRESSION:  1.  Normal esophagram.         REVIEW OF SYSTEMS    Review of Systems: a 10-system review is reviewed at this encounter.  See scanned document.     Patient has no known allergies.     PHYSICAL EXAM:        HEAD: Normal appearance and symmetry:  No cutaneous lesions.      EARS:   Auricles normal         NOSE:    Dorsum:   straight       ORAL CAVITY/OROPHARYNX:    Lips:  Normal.     NECK:  Trachea:  midline       NEURO:   Alert and Oriented    GAIT AND STATION:  normal     RESPIRATORY:   Symmetry and Respiratory effort    PSYCH:   normal mood and affect    SKIN:  warm and dry         IMPRESSION:   Encounter Diagnosis   Name Primary?     Congestion of paranasal sinus Yes        RECOMMENDATIONS:    CT sinus (possibly today per patient request)  Allergy referral   Continue Bashir Med rinses.

## 2022-03-31 ENCOUNTER — OFFICE VISIT (OUTPATIENT)
Dept: OTOLARYNGOLOGY | Facility: CLINIC | Age: 27
End: 2022-03-31
Payer: COMMERCIAL

## 2022-03-31 DIAGNOSIS — R09.81 CONGESTION OF PARANASAL SINUS: Primary | ICD-10-CM

## 2022-03-31 PROCEDURE — 99214 OFFICE O/P EST MOD 30 MIN: CPT | Performed by: OTOLARYNGOLOGY

## 2022-03-31 NOTE — PATIENT INSTRUCTIONS
Follow up with allergist, Dr. Rodriguez ([place])    Massage submandibular gland regularly to keep saliva flowing    CT sinus today    Continue rinses.

## 2022-03-31 NOTE — LETTER
3/31/2022         RE: Servando Mcdonoguh  [address]  [place] [address]        Dear Colleague,    Thank you for referring your patient, Servando Mcdonough, to the [facility]. Please see a copy of my visit note below.    CHIEF COMPLAINT:  Recheck      HISTORY OF PRESENT ILLNESS    Servando was seen in follow up for review of swallow study.  No real improvement with astelin nasal spray.  He has history of spring/fall.   Continues to have nasal congestion since November of last year.  No allergy testing.  No pets at home.  He is getting relief from Bashir Med rinses.  Some swelling of his right submandibular gland.     XR esophagram:    IMPRESSION:  1.  Normal esophagram.         REVIEW OF SYSTEMS    Review of Systems: a 10-system review is reviewed at this encounter.  See scanned document.     Patient has no known allergies.     PHYSICAL EXAM:        HEAD: Normal appearance and symmetry:  No cutaneous lesions.      EARS:   Auricles normal         NOSE:    Dorsum:   straight       ORAL CAVITY/OROPHARYNX:    Lips:  Normal.     NECK:  Trachea:  midline       NEURO:   Alert and Oriented    GAIT AND STATION:  normal     RESPIRATORY:   Symmetry and Respiratory effort    PSYCH:   normal mood and affect    SKIN:  warm and dry         IMPRESSION:   Encounter Diagnosis   Name Primary?     Congestion of paranasal sinus Yes        RECOMMENDATIONS:    CT sinus (possibly today per patient request)  Allergy referral   Continue Bashir Med rinses.       Again, thank you for allowing me to participate in the care of your patient.        Sincerely,        Bashir Yang MD

## 2022-04-09 ENCOUNTER — HOSPITAL ENCOUNTER (OUTPATIENT)
Dept: CT IMAGING | Facility: CLINIC | Age: 27
Discharge: HOME | End: 2022-04-09
Attending: OTOLARYNGOLOGY | Admitting: OTOLARYNGOLOGY
Payer: COMMERCIAL

## 2022-04-09 DIAGNOSIS — R09.81 CONGESTION OF PARANASAL SINUS: ICD-10-CM

## 2022-04-09 PROCEDURE — 70486 CT MAXILLOFACIAL W/O DYE: CPT

## 2022-04-12 ENCOUNTER — TELEPHONE (OUTPATIENT)
Dept: OTOLARYNGOLOGY | Facility: CLINIC | Age: 27
End: 2022-04-12
Payer: COMMERCIAL

## 2022-04-12 NOTE — TELEPHONE ENCOUNTER
Talked with Servando about his recent sinus CT.  Per Dr. Yang I informed the patient that the CT shows some mild inflammation but no active sinus infection and the sinus drainage pathways appear open.  Per Dr. Yang there is a septal spur which can be addresses surgically if he is having any breathing problems.  Patient said that he would think about coming into a follow up visit if he wants to discuss any concerns.  Patient expressed understanding of results.    [facility]      Sheila Helms RN  [facility]  ENT  [facility] [place] [address]  [email or web address]   Office:[phone]  Employed by [employer]

## 2022-04-12 NOTE — TELEPHONE ENCOUNTER
----- Message from Bashir Yang MD sent at 4/11/2022  2:07 PM CDT -----  Please advice patient recent sinus CT shows some mild inflammation but no active sinus infection.  Sinus drainage pathways appear open.  There is however a septal spur which can be addressed surgically if he is experiencing breathing problems.

## 2022-04-18 ENCOUNTER — E-VISIT (OUTPATIENT)
Dept: URGENT CARE | Facility: URGENT CARE | Age: 27
End: 2022-04-18
Payer: COMMERCIAL

## 2022-04-18 DIAGNOSIS — B96.89 ACUTE BACTERIAL SINUSITIS: Primary | ICD-10-CM

## 2022-04-18 DIAGNOSIS — J01.90 ACUTE BACTERIAL SINUSITIS: Primary | ICD-10-CM

## 2022-04-18 PROCEDURE — 99421 OL DIG E/M SVC 5-10 MIN: CPT | Performed by: FAMILY MEDICINE

## 2022-04-18 NOTE — PATIENT INSTRUCTIONS
Dear Servando Mcdonough    After reviewing your responses, I've been able to diagnose you with?a sinus infection caused by bacteria.?     Based on your responses and diagnosis, I have prescribed Augmentin to treat your symptoms. I have sent this to your pharmacy.?     It is also important to stay well hydrated, get lots of rest and take over-the-counter decongestants,?tylenol?or ibuprofen if you?are able to?take those medications per your primary care provider to help relieve discomfort.?     It is important that you take?all of?your prescribed medication even if your symptoms are improving after a few doses.? Taking?all of?your medicine helps prevent the symptoms from returning.?     If your symptoms worsen, you develop severe headache, vomiting, high fever (>102), or are not improving in 7 days, please contact your primary care provider for an appointment or visit any of our convenient Walk-in Care or Urgent Care Centers to be seen which can be found on our website?here.?     Thanks again for choosing?us?as your health care partner,?   ?  García Lopez, DO?

## 2022-06-01 ENCOUNTER — TRANSFERRED RECORDS (OUTPATIENT)
Dept: HEALTH INFORMATION MANAGEMENT | Facility: CLINIC | Age: 27
End: 2022-06-01

## 2022-06-01 ENCOUNTER — OFFICE VISIT (OUTPATIENT)
Dept: INTERNAL MEDICINE | Facility: CLINIC | Age: 27
End: 2022-06-01
Payer: COMMERCIAL

## 2022-06-01 VITALS
OXYGEN SATURATION: 98 % | BODY MASS INDEX: 23.72 KG/M2 | DIASTOLIC BLOOD PRESSURE: 80 MMHG | HEART RATE: 86 BPM | SYSTOLIC BLOOD PRESSURE: 120 MMHG | WEIGHT: 179.8 LBS

## 2022-06-01 DIAGNOSIS — R06.09 DOE (DYSPNEA ON EXERTION): Primary | ICD-10-CM

## 2022-06-01 DIAGNOSIS — R07.9 CHEST PAIN ON EXERTION: ICD-10-CM

## 2022-06-01 PROCEDURE — 99214 OFFICE O/P EST MOD 30 MIN: CPT | Performed by: INTERNAL MEDICINE

## 2022-06-01 RX ORDER — OXYMETAZOLINE HCL 0.05 %
2 SPRAY, NON-AEROSOL (ML) NASAL 2 TIMES DAILY
COMMUNITY
End: 2023-04-21

## 2022-06-01 RX ORDER — INHALER, ASSIST DEVICES
SPACER (EA) MISCELLANEOUS
Qty: 1 EACH | Refills: 0 | Status: SHIPPED | OUTPATIENT
Start: 2022-06-01

## 2022-06-01 RX ORDER — ALBUTEROL SULFATE 90 UG/1
2 AEROSOL, METERED RESPIRATORY (INHALATION) EVERY 6 HOURS PRN
Qty: 18 G | Refills: 3 | Status: SHIPPED | OUTPATIENT
Start: 2022-06-01

## 2022-06-01 NOTE — PROGRESS NOTES
Office Visit - Follow Up   Servando Mcdonough   26 year old male    Date of Visit: 6/1/2022    Chief Complaint   Patient presents with     Chest Pain     During exercise; x 4 months        Assessment and Plan   1. RIOJAS (dyspnea on exertion)  In the recent past he said it unremarkable chest x-ray, esophagram, echocardiogram, EKG and lab work.  His symptoms may be more respiratory such as an exercise-induced asthma.  Could be GI and he is going to review his symptoms with his gastroenterologist.  We will also obtain an exercise stress test and some pulmonary function test with this as his symptoms occur with exercise.  He can try some albuterol before exercise  - Exercise Stress Test - Adult; Future  - albuterol (PROAIR HFA/PROVENTIL HFA/VENTOLIN HFA) 108 (90 Base) MCG/ACT inhaler; Inhale 2 puffs into the lungs every 6 hours as needed for shortness of breath / dyspnea (before exercise)  Dispense: 18 g; Refill: 3  - spacer (OPTICHAMBER TOY) holding chamber; Use with albuterol inhaler  Dispense: 1 each; Refill: 0    2. Chest pain on exertion  - Exercise Stress Test - Adult; Future  - albuterol (PROAIR HFA/PROVENTIL HFA/VENTOLIN HFA) 108 (90 Base) MCG/ACT inhaler; Inhale 2 puffs into the lungs every 6 hours as needed for shortness of breath / dyspnea (before exercise)  Dispense: 18 g; Refill: 3    Return in about 4 weeks (around 6/29/2022) for Office visit, if symptoms worsen/fail to improve.     History of Present Illness   This 26 year old man comes in for evaluation of some chest discomfort and breathing difficulty when he is exercising.  This comes on after a few minutes of aerobic activity.  He describes the pain as sharp and associated with some breathing difficulty and cough.  Pain does not occur at rest.  He has previously had some chest wall pain which has been evaluated with imaging.  He had an echocardiogram which looked okay.  He has EKGs which showed normal sinus rhythm.  Recently had an esophagram which  showed no reflux.  He had a CT of his sinuses which for the most part looked okay.  He has been doing some nasal rinses and therapy.  He has underlying Crohn's disease which is well controlled.  He does not recall his last endoscopy.    Review of Systems: A comprehensive review of systems was negative except as noted.     Medications, Allergies and Problem List   Reviewed, reconciled and updated  Post Discharge Medication Reconciliation Status:   Social History     Social History Narrative    Graduate at [employer].  Works at [employer].  Kiera Calderon.  He works in [employer]. Brother and father are dentists, brother is .         Physical Exam   General Appearance:   No acute distress    /80 (BP Location: Left arm, Patient Position: Sitting, Cuff Size: Adult Regular)   Pulse 86   Wt 81.6 kg (179 lb 12.8 oz)   SpO2 98%   BMI 23.72 kg/m      Cardiovascular regular rate and rhythm no murmur gallop or rub  Pulmonary lungs are clear to auscultation bilaterally  Neurologic exam is non focal  Psychiatric pleasant, no confusion or agitation        Additional Information   Current Outpatient Medications   Medication Sig Dispense Refill     albuterol (PROAIR HFA/PROVENTIL HFA/VENTOLIN HFA) 108 (90 Base) MCG/ACT inhaler Inhale 2 puffs into the lungs every 6 hours as needed for shortness of breath / dyspnea (before exercise) 18 g 3     HUMIRA,CF, PEN 40 mg/0.4 mL PnKt [HUMIRA,CF, PEN 40 MG/0.4 ML PNKT] Inject 40 mg as directed every 14 (fourteen) days.       oxymetazoline (AFRIN NASAL SPRAY) 0.05 % nasal spray Spray 2 sprays into both nostrils 2 times daily       spacer (OPTICHAMBER TOY) holding chamber Use with albuterol inhaler 1 each 0     No Known Allergies  Social History     Tobacco Use     Smoking status: Never Smoker     Smokeless tobacco: Never Used   Substance Use Topics     Alcohol use: Yes     Comment: Alcoholic Drinks/day: 4-8     Drug use: No       Review and/or order of clinical lab tests:  Review  and/or order of radiology tests:  Review and/or order of medicine tests:  Discussion of test results with performing physician:  Decision to obtain old records and/or obtain history from someone other than the patient:  Review and summarization of old records and/or obtaining history from someone other than the patient and.or discussion of case with another health care provider:  Independent visualization of image, tracing or specimen itself:    Time:      Mesfin Costello MD  Answers for HPI/ROS submitted by the patient on 5/29/2022  Nitroglycerin use:: never  Do you take an aspirin every day?: No  How many servings of fruits and vegetables do you eat daily?: 0-1  On average, how many sweetened beverages do you drink each day (Examples: soda, juice, sweet tea, etc.  Do NOT count diet or artificially sweetened beverages)?: 1  How many minutes a day do you exercise enough to make your heart beat faster?: 30 to 60  How many days a week do you exercise enough to make your heart beat faster?: 3 or less  How many days per week do you miss taking your medication?: 0

## 2022-06-03 ENCOUNTER — HOSPITAL ENCOUNTER (OUTPATIENT)
Dept: CARDIOLOGY | Facility: CLINIC | Age: 27
Discharge: HOME | End: 2022-06-03
Attending: INTERNAL MEDICINE
Payer: COMMERCIAL

## 2022-06-03 PROCEDURE — 93016 CV STRESS TEST SUPVJ ONLY: CPT | Performed by: INTERNAL MEDICINE

## 2022-06-03 PROCEDURE — 93017 CV STRESS TEST TRACING ONLY: CPT

## 2022-06-03 PROCEDURE — 93018 CV STRESS TEST I&R ONLY: CPT | Performed by: INTERNAL MEDICINE

## 2022-07-29 ENCOUNTER — LAB REQUISITION (OUTPATIENT)
Dept: LAB | Facility: CLINIC | Age: 27
End: 2022-07-29
Payer: COMMERCIAL

## 2022-07-29 PROCEDURE — 87077 CULTURE AEROBIC IDENTIFY: CPT | Mod: ORL | Performed by: OTOLARYNGOLOGY

## 2022-07-30 ENCOUNTER — LAB REQUISITION (OUTPATIENT)
Dept: LAB | Facility: CLINIC | Age: 27
End: 2022-07-30
Payer: COMMERCIAL

## 2022-08-01 LAB — BACTERIA SINUS CULT: ABNORMAL

## 2022-08-21 ENCOUNTER — HEALTH MAINTENANCE LETTER (OUTPATIENT)
Age: 27
End: 2022-08-21

## 2022-11-21 ENCOUNTER — HEALTH MAINTENANCE LETTER (OUTPATIENT)
Age: 27
End: 2022-11-21

## 2022-12-12 ENCOUNTER — MYC MEDICAL ADVICE (OUTPATIENT)
Dept: INTERNAL MEDICINE | Facility: CLINIC | Age: 27
End: 2022-12-12

## 2022-12-14 ENCOUNTER — OFFICE VISIT (OUTPATIENT)
Dept: INTERNAL MEDICINE | Facility: CLINIC | Age: 27
End: 2022-12-14
Payer: COMMERCIAL

## 2022-12-14 VITALS
HEART RATE: 92 BPM | BODY MASS INDEX: 22.97 KG/M2 | HEIGHT: 74 IN | SYSTOLIC BLOOD PRESSURE: 135 MMHG | WEIGHT: 179 LBS | RESPIRATION RATE: 12 BRPM | OXYGEN SATURATION: 99 % | TEMPERATURE: 97.3 F | DIASTOLIC BLOOD PRESSURE: 76 MMHG

## 2022-12-14 DIAGNOSIS — K50.919 CROHN'S DISEASE WITH COMPLICATION, UNSPECIFIED GASTROINTESTINAL TRACT LOCATION (H): ICD-10-CM

## 2022-12-14 DIAGNOSIS — Z20.2 HPV EXPOSURE: ICD-10-CM

## 2022-12-14 DIAGNOSIS — Z23 HIGH PRIORITY FOR 2019-NCOV VACCINE: ICD-10-CM

## 2022-12-14 DIAGNOSIS — R19.8 ABDOMINAL SYMPTOMS: ICD-10-CM

## 2022-12-14 DIAGNOSIS — Z00.00 ANNUAL PHYSICAL EXAM: Primary | ICD-10-CM

## 2022-12-14 PROCEDURE — 0124A COVID-19 VACCINE BIVALENT BOOSTER 12+ (PFIZER): CPT | Performed by: INTERNAL MEDICINE

## 2022-12-14 PROCEDURE — 99395 PREV VISIT EST AGE 18-39: CPT | Performed by: INTERNAL MEDICINE

## 2022-12-14 PROCEDURE — 91312 COVID-19 VACCINE BIVALENT BOOSTER 12+ (PFIZER): CPT | Performed by: INTERNAL MEDICINE

## 2022-12-14 NOTE — PROGRESS NOTES
Office Visit - Physical   Servando Mcdonough   27 year old  male    Date of visit: 12/14/2022  Physician: Mesfin Costello MD     Assessment and Plan   1. Annual physical exam  This is a 27-year-old man with issues as discussed below.  Ongoing healthy lifestyle discussed and recommend    2. High priority for 2019-nCoV vaccine  - COVID-19,PF,PFIZER BOOSTER BIVALENT 12+Yrs    3. Crohn's disease with complication, unspecified gastrointestinal tract location (H)  On Humira management for male, review of labs    4. HPV exposure  Discussed    5. Abdominal symptoms  Patient concerned about an abdominal aortic aneurysm.  His symptoms would be atypical for an aneurysm in his age and other risk factors would argue against an aortic aneurysm.  None of his prior imaging studies of the abdomen have mentioned an aneurysm and on examination I am unable to palpate an aneurysm.  I did offer an ultrasound evaluation and at this point he will defer.    Return in about 1 year (around 12/14/2023) for Routine preventive.     Chief Complaint   Chief Complaint   Patient presents with     Office Visit     Questions regarding HPV, when bending over he feels a beating in the center of abdomen.     Recheck Medication     Imm/Inj     COVID-19 VACCINE        Patient Profile   Social History     Social History Narrative    Graduate at [employer].  Works at [employer].  Kiera Calderon.  He works in [employer]. Brother and father are dentists, brother is .         Past Medical History   Patient Active Problem List   Diagnosis     Crohn's disease (H)       Past Surgical History  He has a past surgical history that includes appendectomy; Colonoscopy; and Tonsillectomy.     History of Present Illness   This 27 year old man comes in for annual physical.  Overall he is doing well.  His girlfriend was told she has low risk HPV.  The patient has been immunized against HPV.  He has no cyst lesions or other symptoms.  He is immunosuppressed with Humira.  He set up  "few episodes of slight twitching in his upper abdomen.  Breathing better.  Recently saw ENT for sinus issues took antibiotics and steroids feeling better.    Review of Systems: A comprehensive review of systems was negative except as noted.     Medications and Allergies   Current Outpatient Medications   Medication Sig Dispense Refill     albuterol (PROAIR HFA/PROVENTIL HFA/VENTOLIN HFA) 108 (90 Base) MCG/ACT inhaler Inhale 2 puffs into the lungs every 6 hours as needed for shortness of breath / dyspnea (before exercise) 18 g 3     HUMIRA,CF, PEN 40 mg/0.4 mL PnKt [HUMIRA,CF, PEN 40 MG/0.4 ML PNKT] Inject 40 mg as directed every 14 (fourteen) days.       oxymetazoline (AFRIN NASAL SPRAY) 0.05 % nasal spray Spray 2 sprays into both nostrils 2 times daily       spacer (OPTICHAMBER TOY) holding chamber Use with albuterol inhaler 1 each 0     No Known Allergies     Family and Social History   Family History   Problem Relation Age of Onset     No Known Problems Mother      Prostate Cancer Father      No Known Problems Brother      Crohn's Disease Brother      No Known Problems Brother         Social History     Tobacco Use     Smoking status: Never     Smokeless tobacco: Never   Vaping Use     Vaping Use: Never used   Substance Use Topics     Alcohol use: Yes     Comment: Alcoholic Drinks/day: 4-8     Drug use: No        Physical Exam   General Appearance:   No acute distress    /76 (BP Location: Left arm, Patient Position: Sitting, Cuff Size: Adult Regular)   Pulse 92   Temp 97.3  F (36.3  C) (Tympanic)   Resp 12   Ht 1.88 m (6' 2\")   Wt 81.2 kg (179 lb)   SpO2 99%   BMI 22.98 kg/m      EYES: Eyelids, conjunctiva, and sclera were normal. Pupils were normal. Cornea, iris, and lens were normal bilaterally.  HEAD, EARS, NOSE, MOUTH, AND THROAT: Head and face were normal. Hearing was normal to voice and the ears were normal to external exam.   NECK: Neck appearance was normal. There were no neck masses and " the thyroid was not enlarged.  RESPIRATORY: Breathing pattern was normal and the chest moved symmetrically.  Percussion/auscultatory percussion was normal.  Lung sounds were normal and there were no abnormal sounds.  CARDIOVASCULAR: Heart rate and rhythm were normal.  S1 and S2 were normal and there were no extra sounds or murmurs. Peripheral pulses in arms and legs were normal.  Jugular venous pressure was normal.  There was no peripheral edema.  GASTROINTESTINAL: The abdomen was normal in contour.  Bowel sounds were present.  Percussion detected no organ enlargement or tenderness.  Palpation detected no tenderness, mass, or enlarged organs.   MUSCULOSKELETAL: Skeletal configuration was normal and muscle mass was normal for age. Joint appearance was overall normal.  LYMPHATIC: There were no enlarged nodes.  SKIN/HAIR/NAILS: Skin color was normal.  There were no skin lesions.  Hair and nails were normal.  NEUROLOGIC: The patient was alert and oriented to person, place, time, and circumstance. Speech was normal. Cranial nerves were normal. Motor strength was normal for age. The patient was normally coordinated.  PSYCHIATRIC:  Mood and affect were normal and the patient had normal recent and remote memory. The patient's judgment and insight were normal.     Additional Information        Mesfin Costello MD  Internal Medicine  Contact me at [phone]    Answers for HPI/ROS submitted by the patient on 12/14/2022  How many servings of fruits and vegetables do you eat daily?: 0-1  On average, how many sweetened beverages do you drink each day (Examples: soda, juice, sweet tea, etc.  Do NOT count diet or artificially sweetened beverages)?: 2  How many minutes a day do you exercise enough to make your heart beat faster?: 30 to 60  How many days a week do you exercise enough to make your heart beat faster?: 3 or less  How many days per week do you miss taking your medication?: 0  What is the reason for your visit today?:  HPV questioning & stomach  When did your symptoms begin?: 1-2 weeks ago

## 2023-04-21 ENCOUNTER — E-VISIT (OUTPATIENT)
Dept: URGENT CARE | Facility: CLINIC | Age: 28
End: 2023-04-21
Payer: COMMERCIAL

## 2023-04-21 DIAGNOSIS — R05.1 ACUTE COUGH: Primary | ICD-10-CM

## 2023-04-21 PROCEDURE — 99421 OL DIG E/M SVC 5-10 MIN: CPT | Performed by: EMERGENCY MEDICINE

## 2023-04-21 RX ORDER — BENZONATATE 200 MG/1
200 CAPSULE ORAL 3 TIMES DAILY PRN
Qty: 30 CAPSULE | Refills: 0 | Status: SHIPPED | OUTPATIENT
Start: 2023-04-21

## 2023-04-21 NOTE — PATIENT INSTRUCTIONS
"  Dear Servando Mcdonough    After reviewing your responses, I've been able to diagnose you with \"Bronchitis\" which is a common infection of your lungs that is nearly always caused by a virus. The virus causes swelling and irritation of the air passages of your lungs which leads to cough. The illness spreads from your nose and throat to your windpipe and airways. It is often called a \"chest cold\" and can last up to 2 weeks, but is not a serious illness. Exposure to cigarette smoke usually makes this significantly worse.      To treat bronchitis, the main thing to do is drink lots of fluids and rest. Cough medications over-the-counter such as mucinex, robitussin or \"cold and sinus\" medications can be helpful. Ibuprofen and Tylenol also help with fevers or aching feelings that you often have with this kind of illness. Do not take ibuprofen if you have kidney disease, stomach ulcers or allergy to aspirin.     Bronchitis is most often highly contagious as viruses are spread through the air or touch. Avoid contact with others who may become infected, particularly children, the elderly and those whose immune systems might be weak.     If your symptoms worsen, you develop chest pain or shortness of breath, fevers over 101, or are not improving in 5 days, please contact your primary care provider for an appointment or visit any of our convenient Walk-in Care or Urgent Care Centers to be seen which can be found on our website here.    Thanks again for choosing us as your health care partner,    Merlin Gastelum MD  "

## 2023-05-03 ENCOUNTER — TRANSFERRED RECORDS (OUTPATIENT)
Dept: HEALTH INFORMATION MANAGEMENT | Facility: CLINIC | Age: 28
End: 2023-05-03
Payer: COMMERCIAL

## 2023-11-14 ENCOUNTER — PATIENT OUTREACH (OUTPATIENT)
Dept: CARE COORDINATION | Facility: CLINIC | Age: 28
End: 2023-11-14
Payer: COMMERCIAL

## 2023-11-28 ENCOUNTER — PATIENT OUTREACH (OUTPATIENT)
Dept: CARE COORDINATION | Facility: CLINIC | Age: 28
End: 2023-11-28
Payer: COMMERCIAL

## 2023-12-28 ENCOUNTER — E-VISIT (OUTPATIENT)
Dept: URGENT CARE | Facility: CLINIC | Age: 28
End: 2023-12-28
Payer: COMMERCIAL

## 2023-12-28 DIAGNOSIS — J11.1 INFLUENZA: Primary | ICD-10-CM

## 2023-12-28 PROCEDURE — 99207 PR NON-BILLABLE SERV PER CHARTING: CPT | Performed by: PHYSICIAN ASSISTANT

## 2023-12-28 NOTE — PATIENT INSTRUCTIONS
Servando,    Thank you for choosing us for your care. I have placed an order for a lab test(s). View your full visit summary for details by clicking on the link below. You can schedule a lab only appointment right here in [employer], or by calling [phone].    You will receive your lab results and next steps via [employer] when the lab results return.    Sincerely,  Ryley Johnson PA-C

## 2023-12-29 ENCOUNTER — LAB (OUTPATIENT)
Dept: LAB | Facility: CLINIC | Age: 28
End: 2023-12-29
Payer: COMMERCIAL

## 2023-12-29 DIAGNOSIS — E83.39 HYPOPHOSPHATEMIA: Primary | ICD-10-CM

## 2023-12-29 LAB
Lab: NORMAL
PERFORMING LABORATORY: NORMAL
TEST NAME: NORMAL

## 2023-12-29 PROCEDURE — 82390 ASSAY OF CERULOPLASMIN: CPT

## 2023-12-29 PROCEDURE — 36415 COLL VENOUS BLD VENIPUNCTURE: CPT

## 2023-12-29 PROCEDURE — 81376 HLA II TYPING 1 LOCUS LR: CPT

## 2024-01-04 LAB — CERULOPLASMIN SERPL-MCNC: 25 MG/DL (ref 20–60)

## 2024-01-08 LAB — MAYO MISC RESULT: NORMAL

## 2024-02-03 ENCOUNTER — HEALTH MAINTENANCE LETTER (OUTPATIENT)
Age: 29
End: 2024-02-03

## 2024-06-11 ENCOUNTER — PATIENT OUTREACH (OUTPATIENT)
Dept: CARE COORDINATION | Facility: CLINIC | Age: 29
End: 2024-06-11
Payer: COMMERCIAL

## 2024-12-24 ENCOUNTER — OFFICE VISIT (OUTPATIENT)
Dept: FAMILY MEDICINE | Facility: CLINIC | Age: 29
End: 2024-12-24
Payer: COMMERCIAL

## 2024-12-24 VITALS
RESPIRATION RATE: 15 BRPM | HEIGHT: 74 IN | DIASTOLIC BLOOD PRESSURE: 73 MMHG | SYSTOLIC BLOOD PRESSURE: 124 MMHG | OXYGEN SATURATION: 100 % | BODY MASS INDEX: 20.92 KG/M2 | HEART RATE: 73 BPM | WEIGHT: 163 LBS | TEMPERATURE: 97.5 F

## 2024-12-24 DIAGNOSIS — K50.80 CROHN'S DISEASE OF BOTH SMALL AND LARGE INTESTINE WITHOUT COMPLICATION (H): ICD-10-CM

## 2024-12-24 DIAGNOSIS — Z00.00 ROUTINE GENERAL MEDICAL EXAMINATION AT A HEALTH CARE FACILITY: Primary | ICD-10-CM

## 2024-12-24 PROCEDURE — 99395 PREV VISIT EST AGE 18-39: CPT

## 2024-12-24 SDOH — HEALTH STABILITY: PHYSICAL HEALTH: ON AVERAGE, HOW MANY MINUTES DO YOU ENGAGE IN EXERCISE AT THIS LEVEL?: 90 MIN

## 2024-12-24 SDOH — HEALTH STABILITY: PHYSICAL HEALTH: ON AVERAGE, HOW MANY DAYS PER WEEK DO YOU ENGAGE IN MODERATE TO STRENUOUS EXERCISE (LIKE A BRISK WALK)?: 4 DAYS

## 2024-12-24 ASSESSMENT — SOCIAL DETERMINANTS OF HEALTH (SDOH): HOW OFTEN DO YOU GET TOGETHER WITH FRIENDS OR RELATIVES?: ONCE A WEEK

## 2024-12-24 ASSESSMENT — PAIN SCALES - GENERAL: PAINLEVEL_OUTOF10: NO PAIN (0)

## 2024-12-24 NOTE — PROGRESS NOTES
Preventive Care Visit  [facility]  CANDICE Arellano CNP, Nurse Practitioner Primary Care  Dec 24, 2024      Assessment & Plan     Routine general medical examination at a health care facility  Preventative exam completed today. Discussed healthy lifestyle recommendations of getting 150 minutes of exercise weekly and eating a healthy diet. Reviewed and updated health maintenance. Recent labs reviewed.  He is on immunosuppressant  medication for his crohn's. Will order vaccines for him and he will return to have them completed.   - REVIEW OF HEALTH MAINTENANCE PROTOCOL ORDERS  - COVID-19 12+ (PFIZER)  - PRIMARY CARE FOLLOW-UP SCHEDULING  - ZOSTER RECOMBINANT ADJUVANTED (SHINGRIX)  - PNEUMOCOCCAL 20 VALENT CONJUGATE (PREVNAR 20)    Crohn's disease of both small and large intestine without complication (H)  Follows with GI at [facility], last in November. Reviewed labs. Continues on humira. Had colonoscopy last year.         Plan to follow up in one year for routine annual physical.       Counseling  Appropriate preventive services were addressed with this patient via screening, questionnaire, or discussion as appropriate for fall prevention, nutrition, physical activity, Tobacco-use cessation, social engagement, weight loss and cognition.  Checklist reviewing preventive services available has been given to the patient.  Reviewed patient's diet, addressing concerns and/or questions.           Chelsea Al is a 29 year old, presenting for the following:  Annual Visit        12/24/2024    10:47 AM   Additional Questions   Roomed by Caroline XIONG          Exercise: gym 4 times/week. Golfing in the summer.    Work: [employer].    Going to [place] with his girlfriend and her family for a few days.    Has not needed albuterol in several years.    No family history of colon cancer.  History of crohn's, Colonoscopy last year.    Needs vaccines updated.     Dad history of prostate cancer s/p  surgical intervention.      Health Care Directive  Patient does not have a Health Care Directive: Discussed advance care planning with patient; information given to patient to review.        12/24/2024   General Health   How would you rate your overall physical health? Excellent   Feel stress (tense, anxious, or unable to sleep) Only a little   (!) STRESS CONCERN      12/24/2024   Nutrition   Three or more servings of calcium each day? Yes   Diet: Regular (no restrictions)   How many servings of fruit and vegetables per day? (!) 0-1   How many sweetened beverages each day? 0-1         12/24/2024   Exercise   Days per week of moderate/strenous exercise 4 days   Average minutes spent exercising at this level 90 min         12/24/2024   Social Factors   Frequency of gathering with friends or relatives Once a week   Worry food won't last until get money to buy more No   Food not last or not have enough money for food? No   Do you have housing? (Housing is defined as stable permanent housing and does not include staying ouside in a car, in a tent, in an abandoned building, in an overnight shelter, or couch-surfing.) Yes   Are you worried about losing your housing? No   Lack of transportation? No   Unable to get utilities (heat,electricity)? No         12/24/2024   Dental   Dentist two times every year? Yes         12/24/2024   TB Screening   Were you born outside of the [place]? No         Today's PHQ-2 Score:       12/24/2024    10:41 AM   PHQ-2 ( 1999 Pfizer)   Q1: Little interest or pleasure in doing things 0   Q2: Feeling down, depressed or hopeless 0   PHQ-2 Score 0    Q1: Little interest or pleasure in doing things Not at all   Q2: Feeling down, depressed or hopeless Not at all   PHQ-2 Score 0       Patient-reported           12/24/2024   Substance Use   Alcohol more than 3/day or more than 7/wk No   Do you use any other substances recreationally? No     Social History     Tobacco Use    Smoking status: Never     "Smokeless tobacco: Never   Vaping Use    Vaping status: Never Used   Substance Use Topics    Alcohol use: Yes     Comment: Alcoholic Drinks/day: 4-8    Drug use: No         12/24/2024   STI Screening   New sexual partner(s) since last STI/HIV test? No         12/24/2024   Contraception/Family Planning   Questions about contraception or family planning No       Reviewed and updated as needed this visit by Provider                    Labs reviewed in EPIC  BP Readings from Last 3 Encounters:   12/24/24 124/73   12/14/22 135/76   06/01/22 120/80    Wt Readings from Last 3 Encounters:   12/24/24 73.9 kg (163 lb)   12/14/22 81.2 kg (179 lb)   06/01/22 81.6 kg (179 lb 12.8 oz)                      Review of Systems  CONSTITUTIONAL: NEGATIVE for fever, chills, change in weight  INTEGUMENTARY/SKIN: NEGATIVE for worrisome rashes, moles or lesions  EYES: NEGATIVE for vision changes or irritation  ENT/MOUTH: NEGATIVE for ear, mouth and throat problems  RESP: NEGATIVE for significant cough or SOB  BREAST: NEGATIVE for masses, tenderness or discharge  CV: NEGATIVE for chest pain, palpitations or peripheral edema  GI: NEGATIVE for nausea, abdominal pain, heartburn, or change in bowel habits  : NEGATIVE for frequency, dysuria, or hematuria  MUSCULOSKELETAL: NEGATIVE for significant arthralgias or myalgia  NEURO: NEGATIVE for weakness, dizziness or paresthesias  ENDOCRINE: NEGATIVE for temperature intolerance, skin/hair changes  HEME: NEGATIVE for bleeding problems  PSYCHIATRIC: NEGATIVE for changes in mood or affect     Objective    Exam  /73 (BP Location: Left arm, Patient Position: Sitting, Cuff Size: Adult Regular)   Pulse 73   Temp 97.5  F (36.4  C) (Tympanic)   Resp 15   Ht 1.88 m (6' 2.02\")   Wt 73.9 kg (163 lb)   SpO2 100%   BMI 20.92 kg/m     Estimated body mass index is 20.92 kg/m  as calculated from the following:    Height as of this encounter: 1.88 m (6' 2.02\").    Weight as of this encounter: 73.9 kg " (163 lb).    Physical Exam  GENERAL: alert and no distress  EYES: Eyes grossly normal to inspection, PERRL and conjunctivae and sclerae normal  HENT: ear canals and TM's normal, nose and mouth without ulcers or lesions  NECK: no adenopathy, no asymmetry, masses, or scars  RESP: lungs clear to auscultation - no rales, rhonchi or wheezes  CV: regular rate and rhythm, normal S1 S2, no S3 or S4, no murmur, click or rub, no peripheral edema  ABDOMEN: soft, nontender, no hepatosplenomegaly, no masses and bowel sounds normal  MS: no gross musculoskeletal defects noted, no edema  SKIN: no suspicious lesions or rashes  NEURO: Normal strength and tone, mentation intact and speech normal  PSYCH: mentation appears normal, affect normal/bright        Signed Electronically by: CANDICE Arellano CNP

## 2024-12-24 NOTE — PATIENT INSTRUCTIONS
Patient Education   Preventive Care Advice   This is general advice given by our system to help you stay healthy. However, your care team may have specific advice just for you. Please talk to your care team about your preventive care needs.  Nutrition  Eat 5 or more servings of fruits and vegetables each day.  Try wheat bread, brown rice and whole grain pasta (instead of white bread, rice, and pasta).  Get enough calcium and vitamin D. Check the label on foods and aim for 100% of the RDA (recommended daily allowance).  Lifestyle  Exercise at least 150 minutes each week  (30 minutes a day, 5 days a week).  Do muscle strengthening activities 2 days a week. These help control your weight and prevent disease.  No smoking.  Wear sunscreen to prevent skin cancer.  Have a dental exam and cleaning every 6 months.  Yearly exams  See your health care team every year to talk about:  Any changes in your health.  Any medicines your care team has prescribed.  Preventive care, family planning, and ways to prevent chronic diseases.  Shots (vaccines)   HPV shots (up to age 26), if you've never had them before.  Hepatitis B shots (up to age 59), if you've never had them before.  COVID-19 shot: Get this shot when it's due.  Flu shot: Get a flu shot every year.  Tetanus shot: Get a tetanus shot every 10 years.  Pneumococcal, hepatitis A, and RSV shots: Ask your care team if you need these based on your risk.  Shingles shot (for age 50 and up)  General health tests  Diabetes screening:  Starting at age 35, Get screened for diabetes at least every 3 years.  If you are younger than age 35, ask your care team if you should be screened for diabetes.  Cholesterol test: At age 39, start having a cholesterol test every 5 years, or more often if advised.  Bone density scan (DEXA): At age 50, ask your care team if you should have this scan for osteoporosis (brittle bones).  Hepatitis C: Get tested at least once in your life.  STIs (sexually  transmitted infections)  Before age 24: Ask your care team if you should be screened for STIs.  After age 24: Get screened for STIs if you're at risk. You are at risk for STIs (including HIV) if:  You are sexually active with more than one person.  You don't use condoms every time.  You or a partner was diagnosed with a sexually transmitted infection.  If you are at risk for HIV, ask about PrEP medicine to prevent HIV.  Get tested for HIV at least once in your life, whether you are at risk for HIV or not.  Cancer screening tests  Cervical cancer screening: If you have a cervix, begin getting regular cervical cancer screening tests starting at age 21.  Breast cancer scan (mammogram): If you've ever had breasts, begin having regular mammograms starting at age 40. This is a scan to check for breast cancer.  Colon cancer screening: It is important to start screening for colon cancer at age 45.  Have a colonoscopy test every 10 years (or more often if you're at risk) Or, ask your provider about stool tests like a FIT test every year or Cologuard test every 3 years.  To learn more about your testing options, visit:   .  For help making a decision, visit:   https://bit.ly/od35951.  Prostate cancer screening test: If you have a prostate, ask your care team if a prostate cancer screening test (PSA) at age 55 is right for you.  Lung cancer screening: If you are a current or former smoker ages 50 to 80, ask your care team if ongoing lung cancer screenings are right for you.  For informational purposes only. Not to replace the advice of your health care provider. Copyright   2023 [employer]. All rights reserved. Clinically reviewed by the [facility]. Zookal 458232 - REV 01/24.